# Patient Record
Sex: FEMALE | Race: WHITE | NOT HISPANIC OR LATINO | Employment: FULL TIME
[De-identification: names, ages, dates, MRNs, and addresses within clinical notes are randomized per-mention and may not be internally consistent; named-entity substitution may affect disease eponyms.]

---

## 2017-02-21 ENCOUNTER — RECORDS - HEALTHEAST (OUTPATIENT)
Dept: ADMINISTRATIVE | Facility: OTHER | Age: 57
End: 2017-02-21

## 2017-07-11 ENCOUNTER — RECORDS - HEALTHEAST (OUTPATIENT)
Dept: ADMINISTRATIVE | Facility: OTHER | Age: 57
End: 2017-07-11

## 2019-01-23 ENCOUNTER — RECORDS - HEALTHEAST (OUTPATIENT)
Dept: ADMINISTRATIVE | Facility: OTHER | Age: 59
End: 2019-01-23

## 2019-02-04 ENCOUNTER — RECORDS - HEALTHEAST (OUTPATIENT)
Dept: ADMINISTRATIVE | Facility: OTHER | Age: 59
End: 2019-02-04

## 2019-11-06 ENCOUNTER — RECORDS - HEALTHEAST (OUTPATIENT)
Dept: ADMINISTRATIVE | Facility: OTHER | Age: 59
End: 2019-11-06

## 2020-01-15 ENCOUNTER — RECORDS - HEALTHEAST (OUTPATIENT)
Dept: ADMINISTRATIVE | Facility: OTHER | Age: 60
End: 2020-01-15

## 2020-01-17 ENCOUNTER — OFFICE VISIT - HEALTHEAST (OUTPATIENT)
Dept: FAMILY MEDICINE | Facility: CLINIC | Age: 60
End: 2020-01-17

## 2020-01-17 ENCOUNTER — COMMUNICATION - HEALTHEAST (OUTPATIENT)
Dept: FAMILY MEDICINE | Facility: CLINIC | Age: 60
End: 2020-01-17

## 2020-01-17 DIAGNOSIS — Z12.31 VISIT FOR SCREENING MAMMOGRAM: ICD-10-CM

## 2020-01-17 DIAGNOSIS — Z00.00 ROUTINE GENERAL MEDICAL EXAMINATION AT A HEALTH CARE FACILITY: ICD-10-CM

## 2020-01-17 DIAGNOSIS — Z12.11 SCREEN FOR COLON CANCER: ICD-10-CM

## 2020-01-17 DIAGNOSIS — E78.2 MIXED HYPERLIPIDEMIA: ICD-10-CM

## 2020-01-17 DIAGNOSIS — Z13.1 DIABETES MELLITUS SCREENING: ICD-10-CM

## 2020-01-17 DIAGNOSIS — Z76.89 ENCOUNTER TO ESTABLISH CARE WITH NEW DOCTOR: ICD-10-CM

## 2020-01-17 DIAGNOSIS — Z30.09 GENERAL COUNSELING FOR PRESCRIPTION OF ORAL CONTRACEPTIVES: ICD-10-CM

## 2020-01-17 DIAGNOSIS — H02.729 HYPOTRICHOSIS OF EYELID, UNSPECIFIED LATERALITY: ICD-10-CM

## 2020-01-17 LAB
ANION GAP SERPL CALCULATED.3IONS-SCNC: 14 MMOL/L (ref 5–18)
BUN SERPL-MCNC: 16 MG/DL (ref 8–22)
CALCIUM SERPL-MCNC: 9.1 MG/DL (ref 8.5–10.5)
CHLORIDE BLD-SCNC: 107 MMOL/L (ref 98–107)
CHOLEST SERPL-MCNC: 216 MG/DL
CO2 SERPL-SCNC: 18 MMOL/L (ref 22–31)
CREAT SERPL-MCNC: 0.74 MG/DL (ref 0.6–1.1)
FASTING STATUS PATIENT QL REPORTED: YES
GFR SERPL CREATININE-BSD FRML MDRD: >60 ML/MIN/1.73M2
GLUCOSE BLD-MCNC: 79 MG/DL (ref 70–125)
HDLC SERPL-MCNC: 96 MG/DL
LDLC SERPL CALC-MCNC: 95 MG/DL
POTASSIUM BLD-SCNC: 4.3 MMOL/L (ref 3.5–5)
SODIUM SERPL-SCNC: 139 MMOL/L (ref 136–145)
TRIGL SERPL-MCNC: 123 MG/DL

## 2020-01-17 RX ORDER — CHLORAL HYDRATE 500 MG
1 CAPSULE ORAL DAILY
Status: SHIPPED | COMMUNITY
Start: 2020-01-17

## 2020-01-17 RX ORDER — SODIUM PHOSPHATE,MONO-DIBASIC 19G-7G/118
1 ENEMA (ML) RECTAL 3 TIMES DAILY
Status: SHIPPED | COMMUNITY
Start: 2020-01-17

## 2020-01-17 RX ORDER — CETIRIZINE HYDROCHLORIDE 10 MG/1
10 TABLET ORAL DAILY
Status: SHIPPED | COMMUNITY
Start: 2020-01-17

## 2020-01-17 ASSESSMENT — ANXIETY QUESTIONNAIRES
2. NOT BEING ABLE TO STOP OR CONTROL WORRYING: NOT AT ALL
7. FEELING AFRAID AS IF SOMETHING AWFUL MIGHT HAPPEN: NOT AT ALL
IF YOU CHECKED OFF ANY PROBLEMS ON THIS QUESTIONNAIRE, HOW DIFFICULT HAVE THESE PROBLEMS MADE IT FOR YOU TO DO YOUR WORK, TAKE CARE OF THINGS AT HOME, OR GET ALONG WITH OTHER PEOPLE: SOMEWHAT DIFFICULT
1. FEELING NERVOUS, ANXIOUS, OR ON EDGE: NOT AT ALL
3. WORRYING TOO MUCH ABOUT DIFFERENT THINGS: SEVERAL DAYS
5. BEING SO RESTLESS THAT IT IS HARD TO SIT STILL: NOT AT ALL
GAD7 TOTAL SCORE: 3
4. TROUBLE RELAXING: SEVERAL DAYS
6. BECOMING EASILY ANNOYED OR IRRITABLE: SEVERAL DAYS

## 2020-01-17 ASSESSMENT — PATIENT HEALTH QUESTIONNAIRE - PHQ9: SUM OF ALL RESPONSES TO PHQ QUESTIONS 1-9: 5

## 2020-02-11 ENCOUNTER — COMMUNICATION - HEALTHEAST (OUTPATIENT)
Dept: FAMILY MEDICINE | Facility: CLINIC | Age: 60
End: 2020-02-11

## 2020-02-12 ENCOUNTER — COMMUNICATION - HEALTHEAST (OUTPATIENT)
Dept: FAMILY MEDICINE | Facility: CLINIC | Age: 60
End: 2020-02-12

## 2020-05-02 ENCOUNTER — COMMUNICATION - HEALTHEAST (OUTPATIENT)
Dept: FAMILY MEDICINE | Facility: CLINIC | Age: 60
End: 2020-05-02

## 2020-05-02 DIAGNOSIS — H02.729 HYPOTRICHOSIS OF EYELID, UNSPECIFIED LATERALITY: ICD-10-CM

## 2021-01-08 ENCOUNTER — COMMUNICATION - HEALTHEAST (OUTPATIENT)
Dept: FAMILY MEDICINE | Facility: CLINIC | Age: 61
End: 2021-01-08

## 2021-01-28 ENCOUNTER — OFFICE VISIT - HEALTHEAST (OUTPATIENT)
Dept: FAMILY MEDICINE | Facility: CLINIC | Age: 61
End: 2021-01-28

## 2021-01-28 DIAGNOSIS — Z30.09 GENERAL COUNSELING FOR PRESCRIPTION OF ORAL CONTRACEPTIVES: ICD-10-CM

## 2021-01-28 DIAGNOSIS — Z12.31 ENCOUNTER FOR SCREENING MAMMOGRAM FOR BREAST CANCER: ICD-10-CM

## 2021-01-28 DIAGNOSIS — Z12.11 SCREEN FOR COLON CANCER: ICD-10-CM

## 2021-01-28 DIAGNOSIS — Z12.4 CERVICAL CANCER SCREENING: ICD-10-CM

## 2021-01-28 DIAGNOSIS — Z13.1 DIABETES MELLITUS SCREENING: ICD-10-CM

## 2021-01-28 DIAGNOSIS — E78.2 MIXED HYPERLIPIDEMIA: ICD-10-CM

## 2021-01-28 DIAGNOSIS — H02.729 HYPOTRICHOSIS OF EYELID, UNSPECIFIED LATERALITY: ICD-10-CM

## 2021-01-28 DIAGNOSIS — Z00.00 ROUTINE GENERAL MEDICAL EXAMINATION AT A HEALTH CARE FACILITY: ICD-10-CM

## 2021-01-28 LAB
ANION GAP SERPL CALCULATED.3IONS-SCNC: 10 MMOL/L (ref 5–18)
BUN SERPL-MCNC: 18 MG/DL (ref 8–22)
CALCIUM SERPL-MCNC: 8.6 MG/DL (ref 8.5–10.5)
CHLORIDE BLD-SCNC: 108 MMOL/L (ref 98–107)
CHOLEST SERPL-MCNC: 213 MG/DL
CO2 SERPL-SCNC: 22 MMOL/L (ref 22–31)
CREAT SERPL-MCNC: 0.79 MG/DL (ref 0.6–1.1)
FASTING STATUS PATIENT QL REPORTED: NO
GFR SERPL CREATININE-BSD FRML MDRD: >60 ML/MIN/1.73M2
GLUCOSE BLD-MCNC: 78 MG/DL (ref 70–125)
HBA1C MFR BLD: 5.1 %
HDLC SERPL-MCNC: 104 MG/DL
LDLC SERPL CALC-MCNC: 89 MG/DL
POTASSIUM BLD-SCNC: 4.4 MMOL/L (ref 3.5–5)
SODIUM SERPL-SCNC: 140 MMOL/L (ref 136–145)
TRIGL SERPL-MCNC: 100 MG/DL

## 2021-01-28 RX ORDER — BIMATOPROST 3 UG/ML
SOLUTION TOPICAL
Qty: 5 ML | Refills: 1 | Status: SHIPPED | OUTPATIENT
Start: 2021-01-28 | End: 2024-06-07

## 2021-01-28 ASSESSMENT — MIFFLIN-ST. JEOR: SCORE: 1222.37

## 2021-01-29 LAB
HPV SOURCE: ABNORMAL
HUMAN PAPILLOMA VIRUS 16 DNA: NEGATIVE
HUMAN PAPILLOMA VIRUS 18 DNA: NEGATIVE
HUMAN PAPILLOMA VIRUS FINAL DIAGNOSIS: ABNORMAL
HUMAN PAPILLOMA VIRUS OTHER HR: POSITIVE
SPECIMEN DESCRIPTION: ABNORMAL

## 2021-02-04 ENCOUNTER — RECORDS - HEALTHEAST (OUTPATIENT)
Dept: ADMINISTRATIVE | Facility: OTHER | Age: 61
End: 2021-02-04

## 2021-02-06 ENCOUNTER — COMMUNICATION - HEALTHEAST (OUTPATIENT)
Dept: FAMILY MEDICINE | Facility: CLINIC | Age: 61
End: 2021-02-06

## 2021-02-10 ENCOUNTER — COMMUNICATION - HEALTHEAST (OUTPATIENT)
Dept: OBGYN | Facility: CLINIC | Age: 61
End: 2021-02-10

## 2021-02-10 LAB
BKR LAB AP ABNORMAL BLEEDING: NO
BKR LAB AP BIRTH CONTROL/HORMONES: ABNORMAL
BKR LAB AP CERVICAL APPEARANCE: NORMAL
BKR LAB AP GYN ADEQUACY: ABNORMAL
BKR LAB AP GYN INTERPRETATION: ABNORMAL
BKR LAB AP GYN OTHER FINDINGS: ABNORMAL
BKR LAB AP HPV REFLEX: ABNORMAL
BKR LAB AP LMP: ABNORMAL
BKR LAB AP PATIENT STATUS: ABNORMAL
BKR LAB AP PREVIOUS ABNORMAL: 2011
BKR LAB AP PREVIOUS NORMAL: 2015
HIGH RISK?: NO
PATH REPORT.COMMENTS IMP SPEC: ABNORMAL
RESULT FLAG (HE HISTORICAL CONVERSION): ABNORMAL

## 2021-02-11 ENCOUNTER — COMMUNICATION - HEALTHEAST (OUTPATIENT)
Dept: FAMILY MEDICINE | Facility: CLINIC | Age: 61
End: 2021-02-11

## 2021-02-25 ENCOUNTER — AMBULATORY - HEALTHEAST (OUTPATIENT)
Dept: FAMILY MEDICINE | Facility: CLINIC | Age: 61
End: 2021-02-25

## 2021-02-25 DIAGNOSIS — R87.619 ABNORMAL PAP SMEAR OF CERVIX: ICD-10-CM

## 2021-02-25 LAB — HCG UR QL: NEGATIVE

## 2021-03-02 LAB
LAB AP CHARGES (HE HISTORICAL CONVERSION): NORMAL
PATH REPORT.COMMENTS IMP SPEC: NORMAL
PATH REPORT.COMMENTS IMP SPEC: NORMAL
PATH REPORT.FINAL DX SPEC: NORMAL
PATH REPORT.GROSS SPEC: NORMAL
PATH REPORT.MICROSCOPIC SPEC OTHER STN: NORMAL
PATH REPORT.RELEVANT HX SPEC: NORMAL
RESULT FLAG (HE HISTORICAL CONVERSION): NORMAL

## 2021-03-29 ENCOUNTER — COMMUNICATION - HEALTHEAST (OUTPATIENT)
Dept: OBGYN | Facility: CLINIC | Age: 61
End: 2021-03-29

## 2021-03-29 ENCOUNTER — AMBULATORY - HEALTHEAST (OUTPATIENT)
Dept: OBGYN | Facility: CLINIC | Age: 61
End: 2021-03-29

## 2021-05-27 ASSESSMENT — PATIENT HEALTH QUESTIONNAIRE - PHQ9: SUM OF ALL RESPONSES TO PHQ QUESTIONS 1-9: 5

## 2021-05-28 ASSESSMENT — ANXIETY QUESTIONNAIRES: GAD7 TOTAL SCORE: 3

## 2021-06-01 ENCOUNTER — RECORDS - HEALTHEAST (OUTPATIENT)
Dept: ADMINISTRATIVE | Facility: CLINIC | Age: 61
End: 2021-06-01

## 2021-06-04 VITALS — HEART RATE: 68 BPM | HEIGHT: 65 IN | SYSTOLIC BLOOD PRESSURE: 100 MMHG | DIASTOLIC BLOOD PRESSURE: 70 MMHG

## 2021-06-05 VITALS
HEIGHT: 64 IN | HEART RATE: 69 BPM | WEIGHT: 149 LBS | SYSTOLIC BLOOD PRESSURE: 110 MMHG | BODY MASS INDEX: 25.44 KG/M2 | DIASTOLIC BLOOD PRESSURE: 70 MMHG

## 2021-06-05 VITALS
BODY MASS INDEX: 26.06 KG/M2 | SYSTOLIC BLOOD PRESSURE: 121 MMHG | HEART RATE: 67 BPM | WEIGHT: 150.8 LBS | DIASTOLIC BLOOD PRESSURE: 66 MMHG

## 2021-06-05 NOTE — TELEPHONE ENCOUNTER
Called [facility] and requested 2012 colonoscopy report.   Notified ok for screening colonoscopy again in 2022

## 2021-06-05 NOTE — PROGRESS NOTES
Assessment/Plan:   Elidia is a 59-year-old female here for physical.    1. Encounter to establish care with new doctor  Establishing care today, all past medical history reviewed and updated in EMR.    2. Routine general medical examination at a health care facility  Physical completed today.  Labs as below.  Due for colonoscopy and mammogram, ordered today.  Appears that last Pap smear was 2015 which would mean she is due  of this year; patient thinks she may have had a Pap smear more recently, SHANKAR signed to obtain records.  - Lipid [place] FASTING  - Basic Metabolic Panel    3. Visit for screening mammogram  Patient believes last mammogram was 2 to 3 years ago, unable to see records at this time but given reported duration discussed that she is due for mammogram, ordered today.  - Mammo Screening Bilateral; Future    4. Screen for colon cancer  Patient reports last colonoscopy was at age 49, due for repeat colon cancer screening, colonoscopy ordered today.  - Ambulatory referral for Colonoscopy    5. Mixed hyperlipidemia  History hyperlipidemia, not currently on medications, will recheck levels today.  - Lipid [place] FASTING    6. Diabetes mellitus screening  Given age, will screen for diabetes today.  - Basic Metabolic Panel    7. Hypotrichosis of eyelid, unspecified laterality  Patient with loss of upper eyelashes bilaterally, wishes to try Latisse for treatment, prescription sent to pharmacy today.  - bimatoprost (LATISSE) 0.03 % ophthalmic solution; Administer 1 application to both eyes daily. Place one drop on applicator and apply evenly along the skin of the upper eyelid at base of eyelashes once daily at bedtime; repeat procedure for second eye (use a clean applicator).  Dispense: 5 mL; Refill: 1    8. General counseling for prescription of oral contraceptives  Pt reports continued menses - she is taking OCPs for regulation. She states a cousin is 62 and still menstruating; pt's mother   before menopause and her sister had a hysterectomy so not much data to assist with this. Pt wishes to continue OCPs at this time - discussed potential clot risk and pt voices understanding but wishes to continue therapy at this time. Discussed that if still menstruating in 1 year would recommend stopping OCPs to see if periods stop - would consider checking LH/FSH once off OCPs.  - desogestrel-ethinyl estradiol (ISIBLOOM) 0.15-0.03 mg per tablet; Take 1 tablet by mouth daily.  Dispense: 84 tablet; Refill: 3      I have had an Advance Directives discussion with the patient.    Follow up: June for pap smear, otherwise 1 year for physical    Kelsey Serna MD  [facility]    Subjective:     Elidia Monterroso is a 59 y.o. female who presents for an annual exam.     RLS - better with OTC iron and magnesium    Healthy Habits:   Healthy Diet: Yes  Regular Exercise: Yes  Sunscreen Use: Yes  Dental Visits Regularly: yes, due for visit  Seat Belt: Yes  Domestic abuse:   No    Health Maintenance reviewed:  Lipid Profile: yes  Glucose Screen: yes  Colonoscopy: due  Mammogram: due    Gynecologic History  Still having periods - LMP 10 days ago, taking OCPs -   Cousin 62 and still menstruating  Contraception: OCP (estrogen/progesterone)  Last Pap: 2015. Results were: Normal with negative HPV - due 5 yrs  History abnormal: 2009, cone biopsy 1985 for abnl pap smear    Immunization History   Administered Date(s) Administered     Hep B, Adult 06/01/2000, 08/02/2000, 01/15/2001     Influenza, Seasonal, Inj PF IIV3 04/03/1998     Influenza,seasonal,quad inj =/> 6months 01/23/2019, 11/06/2019     Td, adult adsorbed, PF 04/03/1998, 01/23/2019     Tdap 04/14/2008     Immunization status: up to date and documented.    Current Outpatient Medications   Medication Sig Dispense Refill     b complex vitamins tablet Take 1 tablet by mouth once.       cetirizine (ZYRTEC) 10 MG tablet Take 10 mg by mouth daily.       cholecalciferol, vitamin  D3, 25 mcg (1,000 unit) capsule Take 1,000 Units by mouth once.       fish oil-omega-3 fatty acids 300-1,000 mg capsule Take 1 g by mouth daily.       glucosamine-chondroitin 500-400 mg cap Take 1 capsule by mouth 3 (three) times a day.       bimatoprost (LATISSE) 0.03 % ophthalmic solution Administer 1 application to both eyes daily. Place one drop on applicator and apply evenly along the skin of the upper eyelid at base of eyelashes once daily at bedtime; repeat procedure for second eye (use a clean applicator). 5 mL 1     desogestrel-ethinyl estradiol (ISIBLOOM) 0.15-0.03 mg per tablet Take 1 tablet by mouth daily. 84 tablet 3     No current facility-administered medications for this visit.      Past Surgical History:   Procedure Laterality Date     CERVICAL CONE BIOPSY  1985     DILATION AND CURETTAGE OF UTERUS  1985     Patient has no known allergies.  Family History   Problem Relation Age of Onset     Diabetes Mother      Parkinsonism Mother      Other Mother         shingles     Valvular heart disease Mother         s/p surg     Arrhythmia Mother      Bone cancer Father 53     Lymphoma Maternal Grandmother 70     Other Maternal Grandfather 98        shingles     Stomach cancer Paternal Grandmother 77     Cancer Paternal Grandfather 65     Stroke Paternal Uncle 80     Heart attack Paternal Aunt 85     No Medical Problems Daughter      Social History     Socioeconomic History     Marital status: Single     Spouse name: Not on file     Number of children: Not on file     Years of education: Not on file     Highest education level: Not on file   Occupational History     Not on file   Social Needs     Financial resource strain: Not on file     Food insecurity:     Worry: Not on file     Inability: Not on file     Transportation needs:     Medical: Not on file     Non-medical: Not on file   Tobacco Use     Smoking status: Former Smoker     Packs/day: 1.00     Years: 30.00     Pack years: 30.00     Types: Cigarettes  "    Last attempt to quit:      Years since quittin.0     Smokeless tobacco: Never Used   Substance and Sexual Activity     Alcohol use: Yes     Frequency: 4 or more times a week     Drinks per session: 1 or 2     Binge frequency: Never     Drug use: Not Currently     Sexual activity: Yes     Partners: Male     Birth control/protection: Pill   Lifestyle     Physical activity:     Days per week: Not on file     Minutes per session: Not on file     Stress: Not on file   Relationships     Social connections:     Talks on phone: Not on file     Gets together: Not on file     Attends Uats[employer] service: Not on file     Active member of club or organization: Not on file     Attends meetings of clubs or organizations: Not on file     Relationship status: Not on file     Intimate partner violence:     Fear of current or ex partner: Not on file     Emotionally abused: Not on file     Physically abused: Not on file     Forced sexual activity: Not on file   Other Topics Concern     Not on file   Social History Narrative     Not on file       Review of Systems  General:  Denies problem  Eyes: Denies problem except loss of eyelashes  Ears/Nose/Throat: Denies problem  Cardiovascular: Denies problem  Respiratory:  Denies problem  Gastrointestinal:  Denies problem   Genitourinary: Denies problem  Musculoskeletal:  Denies problem  Skin: Denies problem except history of dermatitis  Neurologic: Denies problem  Psychiatric: Denies problem  Endocrine: Denies problem  Heme/Lymphatic: Denies problem   Allergic/Immunologic: Denies problem    Objective:        Vitals:    20 1031   BP: 100/70   Pulse: 68   Height: 5' 5\" (1.651 m)       Physical Exam:  General Appearance: Alert, pleasant, appears stated age  Head: Normocephalic, without obvious abnormality  Eyes: PERRL, conjunctiva/corneas clear, EOM's intact  Ears: Normal TM's and external ear canals, both ears  Nose: Nares normal, septum midline,mucosa normal, no " drainage  Throat: Lips, mucosa, and tongue normal; teeth and gums normal; oropharynx is clear  Neck: Supple,without lymphadenopathy, no thyromegaly or nodules noted  Lungs: Clear to auscultation bilaterally, respirations unlabored, no wheezing or crackles  Heart: Regular rate and rhythm, no murmur   Abdomen: Soft, non-tender, no masses, no organomegaly  Extremities: Extremities with strong and symmetric pulses, no cyanosis or edema  Skin: Skin color, texture normal, no rashes or lesions  Neurologic: Grossly normal, no focal deficits

## 2021-06-05 NOTE — TELEPHONE ENCOUNTER
Who is calling:  SHEEBA Almonte  Reason for Call:  States patient has had a colonoscopy on 2012 and is not due until 2022.  Please advise why patient needs one now.  Date of last appointment with primary care: 1/17/2020  Okay to luis angel a detailed message: Yes

## 2021-06-05 NOTE — PATIENT INSTRUCTIONS - HE
Labs today - letter if normal otherwise will call    Mammogram and colonoscopy ordered today - you will get a phone call to schedule these    Looks like pap smear due June - will let you know if this is due later based on records that we will get

## 2021-06-06 NOTE — TELEPHONE ENCOUNTER
Please call pt to let her know that per review of records:    She is up to date on mammogram - was done 2/4/19 - plan repeat in 2 years    She is due for pap smear now. Records state pt had abnormal pap smears in 2013 and 2014 but normal in 2015 therefore needs repeat in 3 years - so will do now. Help schedule appointment for this.    Dr Serna

## 2021-06-06 NOTE — TELEPHONE ENCOUNTER
Reason contacted:  Status update  Information relayed:  Below message   Additional questions:  No  Further follow-up needed:  No  Okay to leave a detailed message:  No

## 2021-06-07 NOTE — TELEPHONE ENCOUNTER
RN cannot approve Refill Request    RN can NOT refill this medication Protocol failed and NO refill given. Last office visit: Visit date not found Last Physical: 1/17/2020 Last MTM visit: Visit date not found Last visit same specialty: Visit date not found.  Next visit within 3 mo: Visit date not found  Next physical within 3 mo: Visit date not found      Cris Mccord, [facility] Triage/Med Refill 5/2/2020    Requested Prescriptions   Pending Prescriptions Disp Refills     bimatoprost (LATISSE) 0.03 % ophthalmic solution [Pharmacy Med Name: BIMATOPROST 0.03% OPH SOLW/BRUSHES] 5 mL 1     Sig: APPLY 1 DROP EVENLY ALONG THE SKIN OF THE UPPER EYELID AT BASE OF EYELASHES. USE NIGHTLY       There is no refill protocol information for this order           
no

## 2021-06-14 NOTE — TELEPHONE ENCOUNTER
Left message to call back for: pt  Information to relay to patient:  Left message for patient to call us back to scheduled with dr rivera that we do accept [employer]  Ins. Call back number  [phone]

## 2021-06-14 NOTE — PROGRESS NOTES
Assessment/Plan:   Elidia is a 60 year old female here for physical without additional concerns.    1. Routine general medical examination at a health care facility  Physical completed today. Labs as below. Vaccines up to date. Pap smear done today; mammogram and colonoscopy ordered.   - Basic Metabolic Panel    2. Mixed hyperlipidemia  Hx HLD not requiring medication, will recheck levels today.  - Lipid [place] RANDOM    3. Diabetes mellitus screening  Given age, will screen for diabetes today.  - Glycosylated Hemoglobin A1c    4. Cervical cancer screening  Due for pap smear cotesting, completed today.  - Gynecologic Cytology (PAP Smear)    5. Hypotrichosis of eyelid, unspecified laterality  Pt requests refill of latisse for eyelashes.  - bimatoprost (LATISSE) 0.03 % ophthalmic solution; APPLY 1 DROP EVENLY ALONG THE SKIN OF THE UPPER EYELID AT BASE OF EYELASHES. USE NIGHTLY  Dispense: 5 mL; Refill: 1    6. General counseling for prescription of oral contraceptives  Pt using OCPs for hormone replacement, aware of potential risks, requests refill which was provided.  - desogestreL-ethinyl estradioL (ISIBLOOM) 0.15-0.03 mg per tablet; Take 1 tablet by mouth daily.  Dispense: 84 tablet; Refill: 3    7. Encounter for screening mammogram for breast cancer  Due for mammogram, ordered today.  - Mammo Screening Bilateral; Future    8. Screen for colon cancer  Due for colon cancer screening, colonoscopy ordered today.  - Ambulatory referral for Colonoscopy - [facility]      I have had an Advance Directives discussion with the patient.    Follow up: 1 year for physical, sooner as needed    Kelsey Serna MD  [facility]    Subjective:     Elidia Monterroso is a 60 y.o. female who presents for an annual exam.     Healthy Habits:   Healthy Diet: Yes  Regular Exercise: Yes  Sunscreen Use: Yes  Dental Visits Regularly: Yes  Seat Belt: Yes  Domestic abuse:   No    Health Maintenance reviewed:  Lipid Profile:  due  Glucose Screen: due  Colonoscopy: due  Mammogram: due    Gynecologic History  No LMP recorded.  Contraception: post menopausal status  pap hx: 2015 nml with neg HPV  abnl pap 4/25/11 ASCUS, 2013 and 2014 but nml HPV  hx colp 2010    Immunization History   Administered Date(s) Administered     Hep B, Adult 06/01/2000, 08/02/2000, 01/15/2001     Influenza, Seasonal, Inj PF IIV3 04/03/1998     Influenza, inj, historic,unspecified 11/01/2020     Influenza,seasonal,quad inj =/> 6months 01/23/2019, 11/06/2019     Td, adult adsorbed, PF 04/03/1998, 01/23/2019     Tdap 04/14/2008     Immunization status: up to date and documented.    Current Outpatient Medications   Medication Sig Dispense Refill     b complex vitamins tablet Take 1 tablet by mouth once.       bimatoprost (LATISSE) 0.03 % ophthalmic solution APPLY 1 DROP EVENLY ALONG THE SKIN OF THE UPPER EYELID AT BASE OF EYELASHES. USE NIGHTLY 5 mL 1     cetirizine (ZYRTEC) 10 MG tablet Take 10 mg by mouth daily.       cholecalciferol, vitamin D3, 25 mcg (1,000 unit) capsule Take 1,000 Units by mouth once.       desogestreL-ethinyl estradioL (ISIBLOOM) 0.15-0.03 mg per tablet Take 1 tablet by mouth daily. 84 tablet 3     fish oil-omega-3 fatty acids 300-1,000 mg capsule Take 1 g by mouth daily.       glucosamine-chondroitin 500-400 mg cap Take 1 capsule by mouth 3 (three) times a day.       No current facility-administered medications for this visit.      History reviewed. No pertinent past medical history.  Past Surgical History:   Procedure Laterality Date     CERVICAL CONE BIOPSY  1985     DILATION AND CURETTAGE OF UTERUS  1985     Patient has no known allergies.  Family History   Problem Relation Age of Onset     Diabetes Mother      Parkinsonism Mother      Other Mother         shingles     Valvular heart disease Mother         s/p surg     Arrhythmia Mother      Bone cancer Father 53     Lymphoma Maternal Grandmother 70     Other Maternal Grandfather 98         shingles     Stomach cancer Paternal Grandmother 77     Cancer Paternal Grandfather 65     Stroke Paternal Uncle 80     Heart attack Paternal Aunt 85     No Medical Problems Daughter      Social History     Socioeconomic History     Marital status: Single     Spouse name: Not on file     Number of children: Not on file     Years of education: Not on file     Highest education level: Not on file   Occupational History     Not on file   Social Needs     Financial resource strain: Not on file     Food insecurity     Worry: Not on file     Inability: Not on file     Transportation needs     Medical: Not on file     Non-medical: Not on file   Tobacco Use     Smoking status: Former Smoker     Packs/day: 1.00     Years: 30.00     Pack years: 30.00     Types: Cigarettes     Quit date:      Years since quittin.0     Smokeless tobacco: Never Used   Substance and Sexual Activity     Alcohol use: Yes     Frequency: 4 or more times a week     Drinks per session: 1 or 2     Binge frequency: Never     Drug use: Not Currently     Sexual activity: Yes     Partners: Male     Birth control/protection: Pill   Lifestyle     Physical activity     Days per week: Not on file     Minutes per session: Not on file     Stress: Not on file   Relationships     Social connections     Talks on phone: Not on file     Gets together: Not on file     Attends Gnosticism service: Not on file     Active member of club or organization: Not on file     Attends meetings of clubs or organizations: Not on file     Relationship status: Not on file     Intimate partner violence     Fear of current or ex partner: Not on file     Emotionally abused: Not on file     Physically abused: Not on file     Forced sexual activity: Not on file   Other Topics Concern     Not on file   Social History Narrative     Not on file       Review of Systems  General:  Denies problem  Eyes: Denies problem  Ears/Nose/Throat: Denies problem  Cardiovascular: Denies  "problem  Respiratory:  Denies problem  Gastrointestinal:  Denies problem   Genitourinary: Denies problem  Musculoskeletal:  Denies problem  Skin: Denies problem  Neurologic: Denies problem  Psychiatric: Denies problem  Endocrine: Denies problem  Heme/Lymphatic: Denies problem   Allergic/Immunologic: Denies problem    Objective:        Vitals:    01/28/21 1353   BP: 110/70   Pulse: 69   Weight: 149 lb (67.6 kg)   Height: 5' 3.78\" (1.62 m)     Body mass index is 25.75 kg/m .    Physical Exam:  General Appearance: Alert, pleasant, appears stated age  Head: Normocephalic, without obvious abnormality  Eyes: PERRL, conjunctiva/corneas clear, EOM's intact  Ears: Normal TM's and external ear canals, both ears  Nose: Nares normal, septum midline,mucosa normal, no drainage  Throat: Lips, mucosa, and tongue normal; teeth and gums normal; oropharynx is clear  Neck: Supple,without lymphadenopathy, no thyromegaly or nodules noted  Lungs: Clear to auscultation bilaterally, respirations unlabored, no wheezing or crackles  Heart: Regular rate and rhythm, no murmur   Abdomen: Soft, non-tender, no masses, no organomegaly  Extremities: Extremities with strong and symmetric pulses, no cyanosis or edema  Skin: Skin color, texture normal, no rashes or lesions  Neurologic: Grossly normal, no focal deficits  Pelvic exam: normal female genitalia, normal appearing cervix, no significant discharge    "

## 2021-06-15 NOTE — PROGRESS NOTES
1/28/21 ASCUS and AGC of endocervical origin, +HR HPV (not 16/18). Plan: colposcopy with ECC and EMB due before 4/28/21

## 2021-06-15 NOTE — TELEPHONE ENCOUNTER
----- Message from Kelsey Serna MD sent at 2/10/2021 11:24 AM CST -----  Can someone call pt to see if she is having any vaginal discharge, itching or burning/pain? Her pap smear showed incidental bacterial vaginosis that only needs treatment if pt is having symptoms. If needed can treat with oral medication or vaginal cream.    Thanks  Dr Serna  ----- Message -----  From: Carmen Dunlap RN  Sent: 2/10/2021  10:27 AM CST  To: Kelsey Serna MD    Hi Dr Serna,    61 yo pt with ASCUS and AGC of endocervical origin, +HR HPV (not 16/18). There was an incidental finding on her pap smear consistent with BV. If follow up from this incidental finding is needed, please forward to your nurse team as incidental findings are not handled by the pap team.    Colposcopy with ECC and EMB is recommended for this pap result. Please advise who you recommend the patient have these procedures completed with, and place referral order if needed. Pap RN will call patient with pap/HPV results and follow up information. Thanks!    Carmen Dunlap RN BSN, Pap Tracking

## 2021-06-15 NOTE — TELEPHONE ENCOUNTER
LMTCB- please see note below and ask if she is having any of the following symptoms, if she is what type of treatment

## 2021-06-15 NOTE — PROGRESS NOTES
Patient was seen for colposcopy today however we did have discussions regarding her ongoing use of the oral contraceptive pill.  We discussed that with her age and likely postmenopausal status, that we can work on transitioning her off the pill and use hormone replacement therapy if needed.  We discussed that we will not know her menopausal status completely until she is off the pill.  We discussed signs and symptoms of menopause.  We discussed that this may be more likely as were working her off the oral contraceptive pill.  We discussed the risks of being on continued oral contraceptive including increased risk of stroke, breast cancer and DVT.  She is in agreement with the plan that we will be taking her off the oral contraceptive pill, consider recheck of FSH in a month.  Monitoring over the next several months to see if we do need to institute hormone replacement therapy.  She will have a follow-up telephone visit in 3 weeks to discuss how she is doing off the pill.      Colposcopy Procedure Note    Elidia Monterroso is a 60 y.o. female is here today for a Colposcopy.    Indications: Pap smear 1 months ago showed: glandular cell abnormality (CHRIS) and high risk HPV. The prior pap showed no abnormalities.  Prior cervical/vaginal disease: HPV related changes. Prior cervical treatment: no treatment.    Procedure Details   The reason for procedure is explained and informed consent obtained.    Speculum placed in vagina and visualization of cervix achieved, cervix swabbed with 3% acetic acid solution. Cervix is also swabbed with Lugol's solution. Procedure detailsCervix swabbed with Lugol's solution, Endocervical curettage performed, Endometrial biopsy performed, Cervical biopsies taken at 5 o'clock, Specimen labelled and sent to pathology and Hemostasis achieved with Monsel's solution    Findings:  Cervix: SCJ visualized - lesion at 5 o'clock;       Specimens: See orders    Complications: none.    Plan:  Specimens  labelled and sent to Pathology. Role of HPV in causing cervical pap smear abnormalities, dysplasia, and cancer is reviewed with the patient. She will be contacted in a few days with biopsy results and recommendations.    Kya Tello

## 2021-06-16 PROBLEM — F17.211 CIGARETTE NICOTINE DEPENDENCE IN REMISSION: Status: ACTIVE | Noted: 2020-01-17

## 2021-06-16 PROBLEM — H91.93 BILATERAL HEARING LOSS: Status: ACTIVE | Noted: 2020-01-17

## 2021-06-16 PROBLEM — E78.2 MIXED HYPERLIPIDEMIA: Status: ACTIVE | Noted: 2020-01-17

## 2021-06-16 PROBLEM — G25.81 RESTLESS LEGS SYNDROME (RLS): Status: ACTIVE | Noted: 2020-01-17

## 2021-06-16 PROBLEM — L30.9 DERMATITIS: Status: ACTIVE | Noted: 2020-01-17

## 2021-06-16 NOTE — PROGRESS NOTES
2/25/21 Colpo ECC neg, EMB neg, bx epithelial atypia suggestive of HPV effect, cervicitis. Plan: cotest in 1 year

## 2021-08-22 ENCOUNTER — HEALTH MAINTENANCE LETTER (OUTPATIENT)
Age: 61
End: 2021-08-22

## 2021-10-17 ENCOUNTER — HEALTH MAINTENANCE LETTER (OUTPATIENT)
Age: 61
End: 2021-10-17

## 2021-11-01 ENCOUNTER — ANCILLARY PROCEDURE (OUTPATIENT)
Dept: MAMMOGRAPHY | Facility: CLINIC | Age: 61
End: 2021-11-01
Payer: COMMERCIAL

## 2021-11-01 DIAGNOSIS — Z12.31 ENCOUNTER FOR SCREENING MAMMOGRAM FOR BREAST CANCER: ICD-10-CM

## 2021-11-01 PROCEDURE — 77067 SCR MAMMO BI INCL CAD: CPT | Mod: TC | Performed by: RADIOLOGY

## 2021-12-15 ENCOUNTER — OFFICE VISIT (OUTPATIENT)
Dept: FAMILY MEDICINE | Facility: CLINIC | Age: 61
End: 2021-12-15
Payer: COMMERCIAL

## 2021-12-15 VITALS — SYSTOLIC BLOOD PRESSURE: 102 MMHG | WEIGHT: 148.3 LBS | BODY MASS INDEX: 25.63 KG/M2 | DIASTOLIC BLOOD PRESSURE: 70 MMHG

## 2021-12-15 DIAGNOSIS — R20.2 NUMBNESS AND TINGLING IN BOTH HANDS: Primary | ICD-10-CM

## 2021-12-15 DIAGNOSIS — R20.0 NUMBNESS AND TINGLING IN BOTH HANDS: Primary | ICD-10-CM

## 2021-12-15 PROCEDURE — 90682 RIV4 VACC RECOMBINANT DNA IM: CPT | Performed by: FAMILY MEDICINE

## 2021-12-15 PROCEDURE — 99214 OFFICE O/P EST MOD 30 MIN: CPT | Mod: 25 | Performed by: FAMILY MEDICINE

## 2021-12-15 PROCEDURE — 90471 IMMUNIZATION ADMIN: CPT | Performed by: FAMILY MEDICINE

## 2021-12-15 NOTE — PROGRESS NOTES
Assessment/Plan:    Numbness and tingling in both hands  Unclear etiology - possible carpal tunnel vs neuropathy. Recommend evaluation with EMG; will trial bilat wrist braces as well. If EMG normal and not improving with bracing then would consider XR and possibly labs for autoimmune arthritis; refer ortho if needed. We did discuss monitoring trigger finger and if needed could refer to ortho for steroid injection vs surgery.  - EMG  - Wrist/Arm/Hand Supplies Order for DME - ONLY FOR DME       Follow up: pending EMG result    Kelsey Serna MD  [facility]    Subjective:   Elidia Monterroso is a 61 year old female is here today for hand pain    -bilateral hands  -started 3 months ago - sx every day, not getting better  -has numbness/tingling in the mornings - just the fingers but not the thumb, can also happen throughout the day - not really with typing/driving  -L hand is worst, nondominant   -active with hands during the day  -will get trigger finger for a few fingers  -hands always feel sore/number  -bilat thumb OA known      Patient Active Problem List   Diagnosis     Restless legs syndrome (RLS)     Mixed hyperlipidemia     Cigarette nicotine dependence in remission     Dermatitis     Bilateral hearing loss     ASCUS with positive high risk HPV cervical     History reviewed. No pertinent past medical history.  Past Surgical History:   Procedure Laterality Date     CONIZATION  1985     DILATION AND CURETTAGE  1985     Current Outpatient Medications   Medication     b complex vitamins tablet     bimatoprost (LATISSE) 0.03 % ophthalmic solution     cetirizine (ZYRTEC) 10 MG tablet     cholecalciferol, vitamin D3, 25 mcg (1,000 unit) capsule     fish oil-omega-3 fatty acids 300-1,000 mg capsule     glucosamine-chondroitin 500-400 mg cap     No current facility-administered medications for this visit.     No Known Allergies  Social History     Socioeconomic History     Marital status: Single     Spouse  name: Not on file     Number of children: Not on file     Years of education: Not on file     Highest education level: Not on file   Occupational History     Not on file   Tobacco Use     Smoking status: Former Smoker     Packs/day: 1.00     Years: 30.00     Pack years: 30.00     Types: Cigarettes     Quit date: 2002     Years since quittin.9     Smokeless tobacco: Never Used   Substance and Sexual Activity     Alcohol use: Yes     Drug use: Not Currently     Sexual activity: Yes     Partners: Male     Birth control/protection: Pill   Other Topics Concern     Not on file   Social History Narrative     Not on file     Social Determinants of Health     Financial Resource Strain: Not on file   Food Insecurity: Not on file   Transportation Needs: Not on file   Physical Activity: Not on file   Stress: Not on file   Social Connections: Not on file   Intimate Partner Violence: Not on file   Housing Stability: Not on file     Family History   Problem Relation Age of Onset     Diabetes Mother      Parkinsonism Mother      Other - See Comments Mother         shingles     Valvular heart disease Mother         s/p surg     Arrhythmia Mother      Bone Cancer Father 53.00     Lymphoma Maternal Grandmother 70.00     Other - See Comments Maternal Grandfather 98.00        shingles     Stomach Cancer Paternal Grandmother 77.00     Cancer Paternal Grandfather 65.00     Cerebrovascular Disease Paternal Uncle 80.00     Coronary Artery Disease Paternal Aunt 85.00     No Known Problems Daughter      Review of systems is as stated in HPI, and the remainder of system review is otherwise negative.    Objective:     /70 (BP Location: Right arm, Patient Position: Sitting, Cuff Size: Adult Regular)   Wt 67.3 kg (148 lb 4.8 oz)   BMI 25.63 kg/m      General appearance: awake, NAD  HEENT: atraumatic, normocephalic, no scleral icterus or injection  Lungs: breathing comfortably on room air  Extremities: negative phalen and tinel  signs, no appreciable swelling/effusion of hands  Neuro: alert, oriented x3, CNs grossly intact, no focal deficits appreciated  Psych: normal mood/affect/behavior, answering questions appropriately, linear thought process

## 2021-12-15 NOTE — PATIENT INSTRUCTIONS
Trigger finger - monitor - if needed can refer to hand orthopedic for injection/management - can send [employer] message if need/want this    Tingling  -will do nerve test called EMG - they will call you to get it get up  -trial wrist bracing in the meantime

## 2022-01-18 ENCOUNTER — TRANSFERRED RECORDS (OUTPATIENT)
Dept: HEALTH INFORMATION MANAGEMENT | Facility: CLINIC | Age: 62
End: 2022-01-18

## 2022-01-18 ENCOUNTER — OFFICE VISIT (OUTPATIENT)
Dept: NEUROLOGY | Facility: CLINIC | Age: 62
End: 2022-01-18
Attending: FAMILY MEDICINE
Payer: COMMERCIAL

## 2022-01-18 DIAGNOSIS — R20.2 NUMBNESS AND TINGLING IN BOTH HANDS: ICD-10-CM

## 2022-01-18 DIAGNOSIS — R20.0 NUMBNESS AND TINGLING IN BOTH HANDS: ICD-10-CM

## 2022-01-18 DIAGNOSIS — G56.03 BILATERAL CARPAL TUNNEL SYNDROME: Primary | ICD-10-CM

## 2022-01-18 PROCEDURE — 95886 MUSC TEST DONE W/N TEST COMP: CPT | Mod: RT | Performed by: PSYCHIATRY & NEUROLOGY

## 2022-01-18 PROCEDURE — 95911 NRV CNDJ TEST 9-10 STUDIES: CPT | Performed by: PSYCHIATRY & NEUROLOGY

## 2022-01-18 NOTE — PROCEDURES
[facility]     Formerly [facility]  [address] [place] [address], [address]  [place] [address]  Tel: [phone]  Fax: [phone]          Full Name: Juan Monterroso Gender: Female  Patient ID: [id number] YOB: 1960      Visit Date: 1/18/2022 09:50  Age: 61 Years 1 Months Old  Interpreted By: Ronny Herron MD   Ref Dr.: Kelsey Serna MD  Tech: ST   Height: 5 feet 5 inch  Reason for referral: Evaluate bilateral uppers. c/o numbness, tingling in both hands for a year. Right > Left.      Motor NCS      Nerve / Sites Lat Amp Dist Maciel    ms mV cm m/s   R Median - APB      Wrist 6.82 2.8 7       Elbow 11.88 1.6 23 46   L Median - APB      Wrist 4.38 7.0 7       Elbow 8.54 6.0 23 55   R Ulnar - ADM      Wrist 2.81 9.9 7       B.Elbow 5.94 9.7 18 58      A.Elbow 7.92 8.2 10 51       F  Wave      Nerve Fmin    ms   R Ulnar - ADM 27.19       Sensory NCS      Nerve / Sites Onset Lat Pk Lat Amp.2-3 Dist Maciel Lat Diff    ms ms  V cm m/s ms   R Median - II (Antidr)      Wrist 3.85 5.36 22.3 13 34    L Median - II (Antidr)      Wrist 2.76 3.65 22.7 13 47    R Ulnar - V (Antidr)      Wrist 2.14 3.13 16.9 11 52    R Median, Ulnar - Transcarpal comparison      Median Palm 3.13 3.80 13.6 8 26       Ulnar Palm 1.51 2.03 26.9 8 53          1.77   L Median, Ulnar - Transcarpal comparison      Median Palm 1.98 2.55 33.7 8 40       Ulnar Palm 1.56 2.03 27.8 8 51          0.52       EMG Summary Table     Spontaneous MUAP Rcmt Note   Muscle Fib PSW Fasc IA # Amp Dur PPP Rate Type   R. Brachioradialis None None None N N N N N N N   R. Pronator teres None None None N N N N N N N   R. Biceps brachii None None None N N N N N N N   R. Deltoid None None None N N N N N N N   R. Triceps brachii None None None N N N N N N N   R. Flexor carpi ulnaris None None None N N N N N N N   R. First dorsal interosseous None None None N N N N N N N   R. Abductor pollicis brevis None None None  N Sl Red Incr Incr N N N   L. Brachioradialis None None None N N N N N N N   L. Pronator teres None None None N N N N N N N   L. Biceps brachii None None None N N N N N N N   L. Deltoid None None None N N N N N N N   L. Triceps brachii None None None N N N N N N N   L. First dorsal interosseous None None None N N N N N N N   L. Abductor pollicis brevis None None None N N N N N N N       SUMMARY  Nerve conduction and EMG study of bilateral upper extremities shows:    Prolonged right median nerve distal motor latency with decreased amplitude and decreased conduction velocity.  Normal left median nerve distal motor latency, amplitude and conduction velocity.  Normal right ulnar distal motor latency, amplitude and conduction velocity.  Normal bilateral median and right ulnar sensory SNAPs with increased right median peak latency.  Increased bilateral (R>L) median-ulnar transcarpal peak latency comparison.  Monopolar needle exam as above.      CLINICAL INTERPRETATION:  This is an abnormal nerve conduction and EMG study.  The study is suggestive of a mild left carpal tunnel syndrome and a moderate to severe right carpal tunnel syndrome.  Further clinical correlation is needed.     Ronny Herron MD  Neurologist  [facility]  Tel:- [phone]

## 2022-01-18 NOTE — LETTER
1/18/2022         RE: Elidia Monterroso  [address] [address]        Dear Colleague,    Thank you for referring your patient, Elidia Monterroso, to the [facility] [place]. Please see a copy of my visit note below.    See procedure note.       Again, thank you for allowing me to participate in the care of your patient.        Sincerely,        Ronny Herron MD

## 2022-01-26 ENCOUNTER — TRANSFERRED RECORDS (OUTPATIENT)
Dept: HEALTH INFORMATION MANAGEMENT | Facility: CLINIC | Age: 62
End: 2022-01-26
Payer: COMMERCIAL

## 2022-02-09 ENCOUNTER — PATIENT OUTREACH (OUTPATIENT)
Dept: FAMILY MEDICINE | Facility: CLINIC | Age: 62
End: 2022-02-09
Payer: COMMERCIAL

## 2022-02-09 PROBLEM — R87.610 ASCUS WITH POSITIVE HIGH RISK HPV CERVICAL: Status: ACTIVE | Noted: 2021-01-28

## 2022-02-09 PROBLEM — R87.810 ASCUS WITH POSITIVE HIGH RISK HPV CERVICAL: Status: ACTIVE | Noted: 2021-01-28

## 2022-02-09 NOTE — LETTER
February 9, 2022      Elidia Monterroso  [address] [address]        Dear ,    This letter is to remind you that you are due for your follow-up Pap smear and Human Papillomavirus (HPV) test.    Please call [phone] to schedule your appointment at your earliest convenience.    If you have completed the appointment outside of the [facility] system, please have the records forwarded to our office. We will update your chart for your provider to review before your next annual wellness visit.     Thank you for choosing [facility]!      Sincerely,    Your [facility] Care Team

## 2022-03-10 NOTE — TELEPHONE ENCOUNTER
Patient due for Pap and HPV.    Reminder done today via telephone call-spoke to the pt phone number given to make an appt.

## 2022-03-30 ENCOUNTER — TRANSFERRED RECORDS (OUTPATIENT)
Dept: HEALTH INFORMATION MANAGEMENT | Facility: CLINIC | Age: 62
End: 2022-03-30
Payer: COMMERCIAL

## 2022-04-02 ENCOUNTER — HEALTH MAINTENANCE LETTER (OUTPATIENT)
Age: 62
End: 2022-04-02

## 2022-04-06 ENCOUNTER — TRANSFERRED RECORDS (OUTPATIENT)
Dept: HEALTH INFORMATION MANAGEMENT | Facility: CLINIC | Age: 62
End: 2022-04-06
Payer: COMMERCIAL

## 2022-05-10 PROBLEM — K63.5 POLYP OF COLON: Status: ACTIVE | Noted: 2021-02-04

## 2022-05-11 ENCOUNTER — OFFICE VISIT (OUTPATIENT)
Dept: FAMILY MEDICINE | Facility: CLINIC | Age: 62
End: 2022-05-11
Payer: COMMERCIAL

## 2022-05-11 VITALS
HEIGHT: 64 IN | DIASTOLIC BLOOD PRESSURE: 84 MMHG | HEART RATE: 64 BPM | BODY MASS INDEX: 24.59 KG/M2 | OXYGEN SATURATION: 100 % | WEIGHT: 144 LBS | SYSTOLIC BLOOD PRESSURE: 114 MMHG

## 2022-05-11 DIAGNOSIS — Z12.4 CERVICAL CANCER SCREENING: ICD-10-CM

## 2022-05-11 DIAGNOSIS — Z13.1 DIABETES MELLITUS SCREENING: ICD-10-CM

## 2022-05-11 DIAGNOSIS — Z00.00 ROUTINE ADULT HEALTH MAINTENANCE: Primary | ICD-10-CM

## 2022-05-11 DIAGNOSIS — L70.0 ACNE VULGARIS: ICD-10-CM

## 2022-05-11 DIAGNOSIS — E78.2 MIXED HYPERLIPIDEMIA: ICD-10-CM

## 2022-05-11 DIAGNOSIS — Z11.59 NEED FOR HEPATITIS C SCREENING TEST: ICD-10-CM

## 2022-05-11 DIAGNOSIS — Z11.4 SCREENING FOR HIV (HUMAN IMMUNODEFICIENCY VIRUS): ICD-10-CM

## 2022-05-11 LAB
ANION GAP SERPL CALCULATED.3IONS-SCNC: 14 MMOL/L (ref 5–18)
BUN SERPL-MCNC: 19 MG/DL (ref 8–22)
CALCIUM SERPL-MCNC: 10.1 MG/DL (ref 8.5–10.5)
CHLORIDE BLD-SCNC: 104 MMOL/L (ref 98–107)
CHOLEST SERPL-MCNC: 201 MG/DL
CO2 SERPL-SCNC: 23 MMOL/L (ref 22–31)
CREAT SERPL-MCNC: 0.68 MG/DL (ref 0.6–1.1)
FASTING STATUS PATIENT QL REPORTED: NO
GFR SERPL CREATININE-BSD FRML MDRD: >90 ML/MIN/1.73M2
GLUCOSE BLD-MCNC: 87 MG/DL (ref 70–125)
HBA1C MFR BLD: 5.4 % (ref 0–5.6)
HDLC SERPL-MCNC: 95 MG/DL
HIV 1+2 AB+HIV1 P24 AG SERPL QL IA: NEGATIVE
LDLC SERPL CALC-MCNC: 93 MG/DL
POTASSIUM BLD-SCNC: 4.6 MMOL/L (ref 3.5–5)
SODIUM SERPL-SCNC: 141 MMOL/L (ref 136–145)
TRIGL SERPL-MCNC: 65 MG/DL

## 2022-05-11 PROCEDURE — 87389 HIV-1 AG W/HIV-1&-2 AB AG IA: CPT | Performed by: FAMILY MEDICINE

## 2022-05-11 PROCEDURE — 0054A COVID-19,PF,PFIZER (12+ YRS): CPT | Performed by: FAMILY MEDICINE

## 2022-05-11 PROCEDURE — 99396 PREV VISIT EST AGE 40-64: CPT | Mod: 25 | Performed by: FAMILY MEDICINE

## 2022-05-11 PROCEDURE — 91305 COVID-19,PF,PFIZER (12+ YRS): CPT | Performed by: FAMILY MEDICINE

## 2022-05-11 PROCEDURE — 83036 HEMOGLOBIN GLYCOSYLATED A1C: CPT | Performed by: FAMILY MEDICINE

## 2022-05-11 PROCEDURE — 80048 BASIC METABOLIC PNL TOTAL CA: CPT | Performed by: FAMILY MEDICINE

## 2022-05-11 PROCEDURE — 86803 HEPATITIS C AB TEST: CPT | Performed by: FAMILY MEDICINE

## 2022-05-11 PROCEDURE — 87624 HPV HI-RISK TYP POOLED RSLT: CPT | Performed by: FAMILY MEDICINE

## 2022-05-11 PROCEDURE — G0123 SCREEN CERV/VAG THIN LAYER: HCPCS | Performed by: FAMILY MEDICINE

## 2022-05-11 PROCEDURE — 80061 LIPID PANEL: CPT | Performed by: FAMILY MEDICINE

## 2022-05-11 PROCEDURE — 36415 COLL VENOUS BLD VENIPUNCTURE: CPT | Performed by: FAMILY MEDICINE

## 2022-05-11 RX ORDER — TRETINOIN 0.5 MG/G
CREAM TOPICAL
Qty: 45 G | Refills: 0 | Status: SHIPPED | OUTPATIENT
Start: 2022-05-11

## 2022-05-11 NOTE — PROGRESS NOTES
Assessment/Plan:   Elidia is a 61-year-old female here for physical without additional concerns.    Routine adult health maintenance  Physical completed today.  Labs as below.  Pap smear completed today.  Up-to-date with colon cancer screening and mammogram.  COVID booster administered today, patient will check with insurance regarding shingles coverage.  - Basic metabolic panel  (Ca, Cl, CO2, Creat, Gluc, K, Na, BUN)  - COVID-19,PF,PFIZER (12+ Yrs GRAY LABEL)    Cervical cancer screening  History ASCUS with positive other HPV, colposcopy showed HPV changes and chronic cervicitis, cotesting completed today.  - Pap Screen with HPV - recommended age 30 - 65 years    Mixed hyperlipidemia  History hyperlipidemia but not requiring statin therapy, will recheck levels today.  - Lipid panel reflex to direct LDL Fasting    Diabetes mellitus screening  We will screen for diabetes today with A1c.  - Hemoglobin A1c    Screening for HIV (human immunodeficiency virus)  Due for one-time HIV screen.  - HIV Antigen Antibody Combo    Need for hepatitis C screening test  Due for one-time hep C screen.  - Hepatitis C Screen Reflex to HCV RNA Quant and Genotype    Acne vulgaris  Patient reports previously following with dermatology and using a retinoid prescription for bumps on her forehead, suspect mild acne and will manage with Retin-A as below.  - tretinoin (RETIN-A) 0.05 % external cream  Dispense: 45 g; Refill: 0       I have had an Advance Directives discussion with the patient.    Follow up: 1 year for physical, sooner as needed    Kelsey Serna MD  [facility]      Subjective:     Elidia Monterroso is a 61 year old female who presents for an annual exam.     Answers for HPI/ROS submitted by the patient on 4/4/2022  Frequency of exercise:: 2-3 days/week  Getting at least 3 servings of Calcium per day:: Yes  Diet:: Regular (no restrictions)  Taking medications regularly:: Yes  Medication side effects::  None  Bi-annual eye exam:: NO  Dental care twice a year:: NO  Sleep apnea or symptoms of sleep apnea:: None  Additional concerns today:: No  Duration of exercise:: 15-30 minutes    Health Maintenance reviewed:  Lipid Profile: due  Glucose Screen: due  Colonoscopy: up to date  Mammogram: up to date    Gynecologic History  No LMP recorded. postmenopausal  Last Pap: 2021. Results were: ASCUS with pos other HPV > colp showed HPV changes and chronic cervicitis - due today    Immunization History   Administered Date(s) Administered     COVID-19,PF,Pfizer (12+ Yrs) 04/17/2021, 05/08/2021, 12/03/2021     FLU 6-35 months 04/03/1998     Flu, Unspecified 11/01/2020     HepB-Adult 06/01/2000, 08/02/2000, 01/15/2001     Influenza Quad, Recombinant, pf(RIV4) (Flublok) 12/15/2021     Influenza Vaccine, 6+MO IM (QUADRIVALENT W/PRESERVATIVES) 01/23/2019, 11/06/2019     TD (ADULT, 7+) 04/03/1998, 01/23/2019     Tdap (Adacel,Boostrix) 04/14/2008     Immunization status: COVID booster given today, patient will check on shingles coverage.    Current Outpatient Medications   Medication Sig Dispense Refill     tretinoin (RETIN-A) 0.05 % external cream Apply topically nightly as needed (skin condition) 45 g 0     b complex vitamins tablet [B COMPLEX VITAMINS TABLET] Take 1 tablet by mouth once.       bimatoprost (LATISSE) 0.03 % ophthalmic solution [BIMATOPROST (LATISSE) 0.03 % OPHTHALMIC SOLUTION] APPLY 1 DROP EVENLY ALONG THE SKIN OF THE UPPER EYELID AT BASE OF EYELASHES. USE NIGHTLY 5 mL 1     cetirizine (ZYRTEC) 10 MG tablet [CETIRIZINE (ZYRTEC) 10 MG TABLET] Take 10 mg by mouth daily.       cholecalciferol, vitamin D3, 25 mcg (1,000 unit) capsule [CHOLECALCIFEROL, VITAMIN D3, 25 MCG (1,000 UNIT) CAPSULE] Take 1,000 Units by mouth once.       fish oil-omega-3 fatty acids 300-1,000 mg capsule [FISH OIL-OMEGA-3 FATTY ACIDS 300-1,000 MG CAPSULE] Take 1 g by mouth daily.       glucosamine-chondroitin 500-400 mg cap [GLUCOSAMINE-CHONDROITIN  500-400 MG CAP] Take 1 capsule by mouth 3 (three) times a day.       History reviewed. No pertinent past medical history.  Past Surgical History:   Procedure Laterality Date     CONIZATION       DILATION AND CURETTAGE       Patient has no known allergies.  Family History   Problem Relation Age of Onset     Diabetes Mother      Parkinsonism Mother      Other - See Comments Mother         shingles     Valvular heart disease Mother         s/p surg     Arrhythmia Mother      Bone Cancer Father 53.00     Lymphoma Maternal Grandmother 70.00     Other - See Comments Maternal Grandfather 98.00        shingles     Stomach Cancer Paternal Grandmother 77.00     Cancer Paternal Grandfather 65.00     Cerebrovascular Disease Paternal Uncle 80.00     Coronary Artery Disease Paternal Aunt 85.00     No Known Problems Daughter      Social History     Socioeconomic History     Marital status: Single     Spouse name: Not on file     Number of children: Not on file     Years of education: Not on file     Highest education level: Not on file   Occupational History     Not on file   Tobacco Use     Smoking status: Former Smoker     Packs/day: 1.00     Years: 30.00     Pack years: 30.00     Types: Cigarettes     Quit date: 2002     Years since quittin.3     Smokeless tobacco: Never Used   Substance and Sexual Activity     Alcohol use: Yes     Drug use: Not Currently     Sexual activity: Yes     Partners: Male     Birth control/protection: Pill   Other Topics Concern     Not on file   Social History Narrative     Not on file     Social Determinants of Health     Financial Resource Strain: Not on file   Food Insecurity: Not on file   Transportation Needs: Not on file   Physical Activity: Not on file   Stress: Not on file   Social Connections: Not on file   Intimate Partner Violence: Not on file   Housing Stability: Not on file       Review of Systems negative unless noted    Objective:        Vitals:    22 0932   BP:  "114/84   Pulse: 64   SpO2: 100%   Weight: 65.3 kg (144 lb)   Height: 1.632 m (5' 4.25\")     Body mass index is 24.53 kg/m .    Physical Exam:  General Appearance: Alert, pleasant, appears stated age  Head: Normocephalic, without obvious abnormality  Eyes: PERRL, conjunctiva/corneas clear, EOM's intact  Ears: Normal TM's and external ear canals, both ears  Neck: Supple,without lymphadenopathy, no thyromegaly or nodules noted  Lungs: Clear to auscultation bilaterally, respirations unlabored, no wheezing or crackles  Heart: Regular rate and rhythm, no murmur   Breast:  Normal appearance.  No palpable masses, nipple discharge, or skin changes  Abdomen: Soft, non-tender, no masses, no organomegaly  Extremities: Extremities with strong and symmetric pulses, no cyanosis or edema  Skin: Skin color, texture normal, no rashes or lesions  Neurologic: Grossly normal, no focal deficits  Pelvic exam: Mild atrophic changes of external genitalia, normal-appearing cervix, no discharge    "

## 2022-05-12 LAB — HCV AB SERPL QL IA: NONREACTIVE

## 2022-05-13 LAB
HUMAN PAPILLOMA VIRUS 16 DNA: NEGATIVE
HUMAN PAPILLOMA VIRUS 18 DNA: NEGATIVE
HUMAN PAPILLOMA VIRUS FINAL DIAGNOSIS: NORMAL
HUMAN PAPILLOMA VIRUS OTHER HR: NEGATIVE

## 2022-05-18 ENCOUNTER — PATIENT OUTREACH (OUTPATIENT)
Dept: FAMILY MEDICINE | Facility: CLINIC | Age: 62
End: 2022-05-18
Payer: COMMERCIAL

## 2022-05-18 LAB
BKR LAB AP GYN ADEQUACY: NORMAL
BKR LAB AP GYN INTERPRETATION: NORMAL
BKR LAB AP HPV REFLEX: NORMAL
BKR LAB AP PREVIOUS ABNL DX: NORMAL
BKR LAB AP PREVIOUS ABNORMAL: NORMAL
PATH REPORT.COMMENTS IMP SPEC: NORMAL
PATH REPORT.COMMENTS IMP SPEC: NORMAL
PATH REPORT.RELEVANT HX SPEC: NORMAL

## 2022-08-24 ENCOUNTER — MYC MEDICAL ADVICE (OUTPATIENT)
Dept: FAMILY MEDICINE | Facility: CLINIC | Age: 62
End: 2022-08-24

## 2022-08-24 ENCOUNTER — OFFICE VISIT (OUTPATIENT)
Dept: FAMILY MEDICINE | Facility: CLINIC | Age: 62
End: 2022-08-24
Payer: COMMERCIAL

## 2022-08-24 VITALS
WEIGHT: 146.6 LBS | DIASTOLIC BLOOD PRESSURE: 78 MMHG | RESPIRATION RATE: 18 BRPM | HEART RATE: 69 BPM | OXYGEN SATURATION: 97 % | BODY MASS INDEX: 24.97 KG/M2 | TEMPERATURE: 98.4 F | SYSTOLIC BLOOD PRESSURE: 112 MMHG

## 2022-08-24 DIAGNOSIS — M54.41 ACUTE BILATERAL LOW BACK PAIN WITH BILATERAL SCIATICA: Primary | ICD-10-CM

## 2022-08-24 DIAGNOSIS — M54.42 ACUTE BILATERAL LOW BACK PAIN WITH BILATERAL SCIATICA: Primary | ICD-10-CM

## 2022-08-24 PROCEDURE — 99213 OFFICE O/P EST LOW 20 MIN: CPT | Performed by: NURSE PRACTITIONER

## 2022-08-24 RX ORDER — NAPROXEN 500 MG/1
500 TABLET ORAL 2 TIMES DAILY WITH MEALS
COMMUNITY
Start: 2022-08-24 | End: 2023-12-21

## 2022-08-24 RX ORDER — CYCLOBENZAPRINE HCL 10 MG
10 TABLET ORAL 3 TIMES DAILY PRN
Qty: 30 TABLET | Refills: 0 | Status: SHIPPED | OUTPATIENT
Start: 2022-08-24 | End: 2023-12-21

## 2022-08-24 ASSESSMENT — ENCOUNTER SYMPTOMS: BACK PAIN: 1

## 2022-08-24 ASSESSMENT — PAIN SCALES - GENERAL: PAINLEVEL: SEVERE PAIN (6)

## 2022-08-24 NOTE — LETTER
August 24, 2022      Elidia Monterroso  [address] [address]        To Whom It May Concern:    Elidia Monterroso was seen in our clinic. It is best that she does not do lifting, pulling, pushing more than 20lbs for the following 48 hours.       Sincerely,        HERMAN Morales CNP

## 2022-08-24 NOTE — PROGRESS NOTES
Assessment & Plan     Acute bilateral low back pain with bilateral sciatica  Discussed RICE, stretching and strengthening for current and future use. Advised naproxen otc (1-2) tablets BID for the following 10-14 days to help with inflammation. Flexeril may be used 3x/day no etoh or driving while taking; best at bedtime. PT strongly advised. Will await final xray reading.     - cyclobenzaprine (FLEXERIL) 10 MG tablet; Take 1 tablet (10 mg) by mouth 3 times daily as needed for muscle spasms  - XR Lumbar Spine 2/3 Views; Future  - Physical Therapy Referral; Future    Advised to f/u in 1-2 weeks if symptoms do not improve and/or worsen. F/u with PCP for continued management.    HERMAN Morales CNP  [facility] DB Brenner is a 61 year old presenting for the following health issues:  Back Pain (Would like to get an order for XR or MRI for low back. Has been having ssx for approx 2 months without injury or trauma.)           Pain History:  When did you first notice your pain? - Acute Pain   Have you seen anyone else for your pain? No  Where in your body do you have pain? Back Pain  Onset/Duration: about 2 months  Description:   Location of pain: low back bilateral, little more on the left  Character of pain: cramping, tightening  Pain radiation: radiates into the left leg  New numbness or weakness in legs, not attributed to pain: no   Intensity: Currently 6/10  Progression of Symptoms: worsening  History:   Specific cause: none  Pain interferes with job: No  History of back problems: no prior back problems  Any previous MRI or X-rays: None  Sees a specialist for back pain: No  Alleviating factors:   Improved by: NSAIDs    Precipitating factors:  Worsened by: Nothing  Therapies tried and outcome: ibuprofen    Accompanying Signs & Symptoms:  Risk of Fracture: None  Risk of Cauda Equina: None  Risk of Infection: None  Risk of Cancer: None  Risk of Ankylosing Spondylitis: Onset at age  <35, male, AND morning back stiffness N/A      Patient states she works at [employer] and lifts heavy objects while working. States it started to worsen recently and feels sciatic going down b/l legs. She is still working and lifting heavy objects. Taking 3 tablets of ibuprofen every 4-5 hours states it does help with the pain. Has worsened since two months ago; once waking up, walking and stretching it does improve. Has not had any therapy's done.     Review of Systems   Musculoskeletal: Positive for back pain.      Constitutional, HEENT, cardiovascular, pulmonary, gi and gu systems are negative, except as otherwise noted.      Objective    /78 (BP Location: Right arm, Patient Position: Sitting, Cuff Size: Adult Large)   Pulse 69   Temp 98.4  F (36.9  C) (Oral)   Resp 18   Wt 66.5 kg (146 lb 9.6 oz)   LMP 03/15/2021 (Approximate)   SpO2 97%   BMI 24.97 kg/m    Body mass index is 24.97 kg/m .  Physical Exam   GENERAL: healthy, alert and no distress  RESP: lungs clear to auscultation - no rales, rhonchi or wheezes  CV: regular rate and rhythm, normal S1 S2, no S3 or S4, no murmur, click or rub, no peripheral edema and peripheral pulses strong  MS: no gross musculoskeletal defects noted, no edema. No swelling or erythema noted. Decreased ROM in left leg>right leg. Unable to stand on one leg without discomfort, moving leg side to side and forward and back while standing causes discomfort as well.  Able to sit and stand without appearing uncomfortable and gait appears normal.     Xray - Reviewed and pending final read from radiologist.                 .  ..  Answers for HPI/ROS submitted by the patient on 8/24/2022  What is the reason for your visit today? : What to get a X-ray or mri referral for back, think it s a pinch nerve  How many servings of fruits and vegetables do you eat daily?: 2-3  On average, how many sweetened beverages do you drink each day (Examples: soda, juice, sweet tea, etc.  Do NOT count  diet or artificially sweetened beverages)?: 0  How many minutes a day do you exercise enough to make your heart beat faster?: 10 to 19  How many days a week do you exercise enough to make your heart beat faster?: 4  How many days per week do you miss taking your medication?: 0

## 2022-10-01 ENCOUNTER — HEALTH MAINTENANCE LETTER (OUTPATIENT)
Age: 62
End: 2022-10-01

## 2022-10-08 ENCOUNTER — HOSPITAL ENCOUNTER (EMERGENCY)
Facility: CLINIC | Age: 62
Discharge: HOME | End: 2022-10-08
Admitting: NURSE PRACTITIONER
Payer: COMMERCIAL

## 2022-10-08 ENCOUNTER — APPOINTMENT (OUTPATIENT)
Dept: RADIOLOGY | Facility: CLINIC | Age: 62
End: 2022-10-08
Payer: COMMERCIAL

## 2022-10-08 ENCOUNTER — NURSE TRIAGE (OUTPATIENT)
Dept: NURSING | Facility: CLINIC | Age: 62
End: 2022-10-08

## 2022-10-08 ENCOUNTER — OFFICE VISIT (OUTPATIENT)
Dept: FAMILY MEDICINE | Facility: CLINIC | Age: 62
End: 2022-10-08
Payer: COMMERCIAL

## 2022-10-08 VITALS
HEART RATE: 89 BPM | BODY MASS INDEX: 24.87 KG/M2 | WEIGHT: 146 LBS | TEMPERATURE: 97.6 F | SYSTOLIC BLOOD PRESSURE: 145 MMHG | RESPIRATION RATE: 16 BRPM | DIASTOLIC BLOOD PRESSURE: 90 MMHG | OXYGEN SATURATION: 100 %

## 2022-10-08 VITALS
OXYGEN SATURATION: 98 % | TEMPERATURE: 97.1 F | RESPIRATION RATE: 18 BRPM | SYSTOLIC BLOOD PRESSURE: 133 MMHG | HEART RATE: 94 BPM | DIASTOLIC BLOOD PRESSURE: 94 MMHG

## 2022-10-08 DIAGNOSIS — S00.83XA CONTUSION OF FACE, INITIAL ENCOUNTER: ICD-10-CM

## 2022-10-08 DIAGNOSIS — S01.511A LIP LACERATION, INITIAL ENCOUNTER: Primary | ICD-10-CM

## 2022-10-08 DIAGNOSIS — S52.122A LEFT RADIAL HEAD FRACTURE: ICD-10-CM

## 2022-10-08 DIAGNOSIS — S63.509A WRIST SPRAIN: ICD-10-CM

## 2022-10-08 DIAGNOSIS — S09.90XA INJURY OF HEAD, INITIAL ENCOUNTER: ICD-10-CM

## 2022-10-08 DIAGNOSIS — S62.113A TRIQUETRAL FRACTURE: ICD-10-CM

## 2022-10-08 DIAGNOSIS — T07.XXXA ABRASIONS OF MULTIPLE SITES: ICD-10-CM

## 2022-10-08 DIAGNOSIS — S59.902A ELBOW INJURY, LEFT, INITIAL ENCOUNTER: ICD-10-CM

## 2022-10-08 DIAGNOSIS — S60.229A CONTUSION OF HAND: ICD-10-CM

## 2022-10-08 DIAGNOSIS — S01.511A LIP LACERATION, INITIAL ENCOUNTER: ICD-10-CM
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
## 2022-10-08 PROCEDURE — 99214 OFFICE O/P EST MOD 30 MIN: CPT | Performed by: PHYSICIAN ASSISTANT

## 2022-10-08 PROCEDURE — 12013 RPR F/E/E/N/L/M 2.6-5.0 CM: CPT

## 2022-10-08 PROCEDURE — 73080 X-RAY EXAM OF ELBOW: CPT | Mod: LT

## 2022-10-08 PROCEDURE — 73110 X-RAY EXAM OF WRIST: CPT | Mod: 50

## 2022-10-08 PROCEDURE — 250N000009 HC RX 250: Performed by: NURSE PRACTITIONER

## 2022-10-08 PROCEDURE — 99285 EMERGENCY DEPT VISIT HI MDM: CPT | Mod: 25

## 2022-10-08 PROCEDURE — 29125 APPL SHORT ARM SPLINT STATIC: CPT | Mod: RT,59

## 2022-10-08 PROCEDURE — 24650 CLTX RDL HEAD/NCK FX WO MNPJ: CPT | Mod: LT

## 2022-10-08 RX ORDER — GINSENG 100 MG
CAPSULE ORAL ONCE
Status: COMPLETED | OUTPATIENT
Start: 2022-10-08 | End: 2022-10-08

## 2022-10-08 RX ADMIN — BACITRACIN 1 APPLICATION.: 500 OINTMENT TOPICAL at 13:57

## 2022-10-08 ASSESSMENT — ENCOUNTER SYMPTOMS
NEUROLOGICAL NEGATIVE: 1
WOUND: 1
CONSTITUTIONAL NEGATIVE: 1
MUSCULOSKELETAL NEGATIVE: 1

## 2022-10-08 ASSESSMENT — ACTIVITIES OF DAILY LIVING (ADL): ADLS_ACUITY_SCORE: 33

## 2022-10-08 NOTE — TELEPHONE ENCOUNTER
Dog pulled her down two stairs and hit mouth. Is bleeding quite a bit and thinks she needs stiches. Would like to know where the nearest [facility] is to her.    Pt provided with location and hours for [facility].    Maria Guadalupe Ambrosio, RN, BSN  [facility]   Triage Nurse Advisor        Reason for Disposition    General information question, no triage required and triager able to answer question    Additional Information    Negative: [1] Caller is not with the adult (patient) AND [2] reporting urgent symptoms    Negative: Lab result questions    Negative: Medication questions    Negative: Caller can't be reached by phone    Negative: Caller has already spoken to PCP or another triager    Negative: RN needs further essential information from caller in order to complete triage    Negative: Requesting regular office appointment    Negative: [1] Caller requesting NON-URGENT health information AND [2] PCP's office is the best resource    Negative: Health Information question, no triage required and triager able to answer question    Protocols used: INFORMATION ONLY CALL - NO TRIAGE-A-

## 2022-10-08 NOTE — DISCHARGE INSTRUCTIONS
We cleaned and closed the wound in the ER today.    No fracture was found in the left wrist.    There is a suspected fracture in the right wrist. Leave the splint in place until you follow up with orthopedics    We also noted a fracture of your left elbow (left radial head). Wear the sling. Avoid using your left arm.    TO CARE FOR THE WOUND AT HOME:  Apply antibiotic ointment to your wounds twice daily until healed.  You can take acetaminophen or ibuprofen for discomfort.  Read and follow label directions  Apply ice to your injuries for 20 minutes 3-4 times daily over the next 2 days.  Activity as tolerated.  Watch for signs of infection (redness, increasing pain or yellow drainage) and if they develop, come back to the Emergency Department immediately for treatment.  Follow up with orthopedics this next week for further care. Call to schedule.     GET PROMPT MEDICAL ATTENTION if any of the following occur:  Repeated vomiting  Severe or worsening headache or dizziness  Unusual drowsiness, or unable to awaken as usual  Confusion or change in behavior or speech, memory loss, blurred vision  Convulsion (seizure)  Increasing scalp or face swelling  Redness, warmth or pus from the swollen area  Fluid drainage or bleeding from the nose or ears  ---------------------------------------------------------------------------------------------------

## 2022-10-08 NOTE — ED TRIAGE NOTES
Pt presents with laceration to upper lip after falling on dog. Pt also reports bilateral wrist pain. Denies LOC or thinners. ABCs intact.      Triage Assessment     Row Name 10/08/22 120       Triage Assessment (Adult)    Airway WDL WDL       Respiratory WDL    Respiratory WDL WDL       Skin Circulation/Temperature WDL    Skin Circulation/Temperature WDL WDL       Cardiac WDL    Cardiac WDL WDL       Peripheral/Neurovascular WDL    Peripheral Neurovascular WDL WDL       Cognitive/Neuro/Behavioral WDL    Cognitive/Neuro/Behavioral WDL WDL

## 2022-10-08 NOTE — ED PROVIDER NOTES
EMERGENCY DEPARTMENT ENCOUNTER      NAME: Elidia Monterroso  AGE: 61 year old female  YOB: 1960  MRN: [id number]  EVALUATION DATE & TIME: 10/8/2022 12:31 PM    PCP: Kelsey Serna    ED PROVIDER: HERMAN Veliz, CNP      Chief Complaint   Patient presents with     Fall     Laceration         FINAL IMPRESSION:  1. Injury of head, initial encounter    2. Contusion of face, initial encounter    3. Lip laceration, initial encounter    4. Wrist sprain    5. Contusion of hand    6. Elbow injury, left, initial encounter    7. Abrasions of multiple sites    8. Triquetral fracture    9. Left radial head fracture          ED COURSE & MEDICAL DECISION MAKIN:31 PM I met with the patient, obtained history, performed an initial exam, and discussed options and plan for treatment here in the ED.  1:26 PM laceration repair  2:18 PM updated patient. Splinted right wrist.    Pertinent Labs & Imaging studies reviewed. (See chart for details)  61 year old female presents to the Emergency Department for evaluation of injuries after falling.  Patient not anticoagulated.  No headache or neurodeficits.  Low suspicion for intracranial injury.  Decision made today to defer head CT.  No spinal tenderness on exam.  Was noted to have injuries to the left and right upper extremities along with facial injuries.  Laceration to the upper lip was surgically repaired.  Did not have any symptoms or exam findings to suggest facial fracture and no obvious dental injury.  Imaging of the upper extremities was notable for possible right triquetral fracture.  This was placed in a splint.  No fracture noted in the left wrist.  Left radial head fracture noted and she was given a sling.  Recommend that she follow-up this week with [facility] for ongoing care.  Take over-the-counter medications for any discomfort.  Recommend applying ice for 20 minutes 3-4 times daily over the next 2 days.  If she has any  new/worsening symptoms or other concerns, she was instructed to return to the ER for further care    At the conclusion of the encounter I discussed the results of all of the tests and the disposition. The questions were answered. The patient or family acknowledged understanding and was agreeable with the care plan.            MEDICATIONS GIVEN IN THE EMERGENCY:  Medications   bacitracin ointment (1 Application Topical Given 10/8/22 1194)       NEW PRESCRIPTIONS STARTED AT TODAY'S ER VISIT  New Prescriptions    No medications on file            =================================================================    HPI    Patient information was obtained from: patient    Use of Intrepreter: N/A        Elidia Monterroso is a 61 year old female who  has a past medical history of Bilateral hearing loss (1/17/2020), Mixed hyperlipidemia (1/17/2020), and Restless legs syndrome (RLS) (1/17/2020) and presents today for evaluations of injuries after falling. She fell around 9:30 AM due to her dog. She fell down forward down 2 steps. Notes injuries to her wrists, left elbow and face.  No loss of consciousness, headache, nausea, vomiting, neck pain, worsening of back pain, or other injuries or concerns.  Last tetanus 2019.  Sent from urgent care for evaluation.      REVIEW OF SYSTEMS   All systems reviewed and negative except as noted in HPI.         PAST MEDICAL HISTORY:  Past Medical History:   Diagnosis Date     Bilateral hearing loss 1/17/2020    Hearing aids 2018      Mixed hyperlipidemia 1/17/2020     Restless legs syndrome (RLS) 1/17/2020       PAST SURGICAL HISTORY:  Past Surgical History:   Procedure Laterality Date     CONIZATION  1985     DILATION AND CURETTAGE  1985           CURRENT MEDICATIONS:    No current facility-administered medications for this encounter.     Current Outpatient Medications   Medication     b complex vitamins tablet     bimatoprost (LATISSE) 0.03 % ophthalmic solution     cetirizine (ZYRTEC) 10  MG tablet     cholecalciferol, vitamin D3, 25 mcg (1,000 unit) capsule     cyclobenzaprine (FLEXERIL) 10 MG tablet     fish oil-omega-3 fatty acids 300-1,000 mg capsule     glucosamine-chondroitin 500-400 mg cap     naproxen (NAPROSYN) 500 MG tablet     tretinoin (RETIN-A) 0.05 % external cream         ALLERGIES:  No Known Allergies    FAMILY HISTORY:  Family History   Problem Relation Age of Onset     Diabetes Mother      Parkinsonism Mother      Other - See Comments Mother         shingles     Valvular heart disease Mother         s/p surg     Arrhythmia Mother      Bone Cancer Father 53.00     Lymphoma Maternal Grandmother 70.00     Other - See Comments Maternal Grandfather 98.00        shingles     Stomach Cancer Paternal Grandmother 77.00     Cancer Paternal Grandfather 65.00     Cerebrovascular Disease Paternal Uncle 80.00     Coronary Artery Disease Paternal Aunt 85.00     No Known Problems Daughter        SOCIAL HISTORY:   Social History     Socioeconomic History     Marital status: Single   Tobacco Use     Smoking status: Former Smoker     Packs/day: 1.00     Years: 30.00     Pack years: 30.00     Types: Cigarettes     Quit date: 2002     Years since quittin.7     Smokeless tobacco: Never Used   Substance and Sexual Activity     Alcohol use: Yes     Drug use: Not Currently     Sexual activity: Yes     Partners: Male     Birth control/protection: Pill         VITALS:  Patient Vitals for the past 24 hrs:   BP Temp Temp src Pulse Resp SpO2   10/08/22 1209 (!) 143/95 -- -- -- -- --   10/08/22 1207 -- 97.1  F (36.2  C) Temporal 82 16 99 %       PHYSICAL EXAM    Constitutional:  Well developed, well nourished, no acute distress. GCS 15  EYES: Conjunctivae clear. PERRL. EOM's intact.  HENT: Contusion chin, left cheek.  Small nasal abrasion.  No septal hematoma.  TMs normal without hemotympanum.  No hawkins signs or raccoon eyes. 3 CM laceration mid upper lip that crosses the [place]  border.  Respiratory:  No respiratory distress  Musculoskeletal: Mild tenderness with palpation of both wrists.  Mild left elbow tenderness.  Left elbow abrasion.  Integument: Warm, Dry.  Facial laceration as noted above.  Neurologic:  Alert & oriented x 3. CN 3-12 grossly intact.              LAB:  All pertinent labs reviewed and interpreted.  Labs Ordered and Resulted from Time of ED Arrival to Time of ED Departure - No data to display      RADIOLOGY:  Reviewed all pertinent imaging. Please see official radiology report.  Elbow  XR, G/E 3 views, left   Final Result   IMPRESSION: Large elbow joint effusion. Acute nondisplaced mildly impacted fracture of the radial neck. The radial head articular cortical surface appears intact. Normal left elbow joint alignment.      XR Wrist Left G/E 3 Views   Final Result   IMPRESSION: Severe degenerative arthritic changes at the base of the thumb in the first CMC joint. Mild degenerative changes in the thumb MCP and STT joints. Normal joint alignment and spacing in the left wrist elsewhere. No fracture, erosion, or bone    lesion. Normal soft tissues.      XR Wrist Right G/E 3 Views   Final Result   IMPRESSION: Age indeterminant nondisplaced dorsal triquetral fracture, visible on the lateral view. On the oblique projection, there is linear lucency overlying the ulnar styloid, which is favored to represent a summation shadow, with no adjacent soft    tissue swelling present. Given correlation with an absence of focal pain and tenderness at the radius. Advanced degenerative arthritic changes at the base of the thumb and the first CMC joint. No acute or significant bone or joint abnormality elsewhere.                PROCEDURES:   PROCEDURE: Laceration Repair   INDICATIONS: Laceration   PROCEDURE PROVIDER: HERMAN Ackerman CNP    SITE: Upper lip   TYPE/SIZE: complex, clean and no foreign body visualized  3 cm (total length)   FUNCTIONAL ASSESSMENT: Distal sensation,  circulation and motor intact   MEDICATION: 2 mLs of 1% Lidocaine with epinephrine   PREPARATION: scrubbing and irrigation with Normal saline and Betadine   DEBRIDEMENT: no debridement and wound explored, no foreign body found   CLOSURE:  Superficial layer closed with 5 stitches of 5-0 Fast Absorbing simple interrupted    Total number of sutures/staples placed: 5         PROCEDURE: Splint Placement   INDICATIONS: right Triquetral fracture   PROCEDURE PROVIDER: Lennox Baeza CNP   NOTE:  A Premolded aluminum padded splint splint made of was applied to the right wrist by the above provider. As noted in the physical exam, distal CMS was intact prior to placement. The splint was checked and the fit was adjusted to ensure proper positioning after placement. Sensation and circulation, as well as motor function, are unchanged after splint placement and the patient is more comfortable with the splint in place.           HERMAN Veliz, CNP  Emergency Medicine  [facility] EMERGENCY ROOM  [address] [place] [id number]  [phone]  Dept: [phone]         Lennox Baeza APRN CNP  10/08/22 1421

## 2022-10-08 NOTE — PROGRESS NOTES
Assessment & Plan     Lip laceration, initial encounter    Accidental fall, initial encounte       Laceration through vermillion border requiring dermatologist/plastic surgery consult. Instructed to go to ED immediately.     Subjective     Elidia is a 61 year old female who presents to clinic today with daughter for the following health issues:  Chief Complaint   Patient presents with     Fall     Fell on hit lip, both arms, check on left eye redness possible pink eye      Elidia presents with reports of injury to her lip and left arm this AM. 0915 this AM dog pulled her down 2 steps and hurt lip and left elbow. She denies loss of consciousness, nausea, vomiting, amnesia.           Review of Systems   Constitutional: Negative.    Musculoskeletal: Negative.    Skin: Positive for wound.   Neurological: Negative.            Objective    BP (!) 145/90   Pulse 89   Temp 97.6  F (36.4  C) (Oral)   Resp 16   Wt 66.2 kg (146 lb)   SpO2 100%   BMI 24.87 kg/m    Physical Exam  Constitutional:       Appearance: Normal appearance.   HENT:      Head: Normocephalic and atraumatic.   Musculoskeletal:      Cervical back: Normal range of motion and neck supple.   Skin:     General: Skin is warm and dry.      Comments: Lip laceration through vermillion border, completely through lip   Neurological:      General: No focal deficit present.      Mental Status: She is alert and oriented to person, place, and time.              Magdy Duarte PA-C

## 2022-10-10 ENCOUNTER — TRANSFERRED RECORDS (OUTPATIENT)
Dept: HEALTH INFORMATION MANAGEMENT | Facility: CLINIC | Age: 62
End: 2022-10-10

## 2022-11-01 ENCOUNTER — TRANSFERRED RECORDS (OUTPATIENT)
Dept: HEALTH INFORMATION MANAGEMENT | Facility: CLINIC | Age: 62
End: 2022-11-01

## 2022-11-11 ENCOUNTER — HOSPITAL ENCOUNTER (OUTPATIENT)
Dept: PHYSICAL THERAPY | Facility: REHABILITATION | Age: 62
Discharge: HOME | End: 2022-11-11
Payer: COMMERCIAL

## 2022-11-11 DIAGNOSIS — M54.42 ACUTE BILATERAL LOW BACK PAIN WITH BILATERAL SCIATICA: ICD-10-CM

## 2022-11-11 DIAGNOSIS — M54.41 ACUTE BILATERAL LOW BACK PAIN WITH BILATERAL SCIATICA: ICD-10-CM

## 2022-11-11 PROCEDURE — 97110 THERAPEUTIC EXERCISES: CPT | Mod: GP

## 2022-11-11 PROCEDURE — 97161 PT EVAL LOW COMPLEX 20 MIN: CPT | Mod: GP

## 2022-11-11 NOTE — PROGRESS NOTES
"   11/11/22 0900   General Information   Type of Visit Initial OP Ortho PT Evaluation   Start of Care Date 11/11/22   Referring Physician HERMAN Morales CNP   Patient/Family Goals Statement \"Help subside pain\"   Orders Evaluate and Treat   Date of Order 08/24/22   Certification Required? No   Medical Diagnosis Acute bilateral low back pain with bilateral sciatica   Surgical/Medical history reviewed Yes   Presentation and Etiology   Pertinent history of current problem (include personal factors and/or comorbidities that impact the POC) Elidia reports that in July of this year, she started to central low back pain without any apparent mechanism of injury. Over time, she started to get pain into left > right buttocks, and that gradually spread to down her left > right legs usually stopping proximal to the knee, but occasionally going down to the foot. She also reports muscle spasms in her bilateral buttocks, but she started taking magnesium supplements, and that has helped reduce the spasms significantly. She also reports a fall on 10/8/22 when her dog pulled her down two stairs. She went to the ER with a cut lip and and bilateral arm pain. They confirmed a break in the left elbow and right wrist, which she had a brace for. She feels like those are all healing well. She thinks that her back problems continued to her dog pulling her off balance. She had another fall not long after as she was running to catch her dog, and she tripped and fell. She said that she just couldn't run normal. She denies any bowel or bladder changes, though she does say that bowel movements are slightly more painful than before. She does report some tingling in the left > right leg in the same distribution as her pain. She also noted numbness in the left leg one day after extended standing at work.   Impairments A. Pain;D. Decreased ROM;E. Decreased flexibility;F. Decreased strength and endurance;G. Impaired balance;H. Impaired gait;K. " Numbness;L. Tingling   Functional Limitations perform activities of daily living;perform required work activities;perform desired leisure / sports activities   Symptom Location See history   How/Where did it occur From insidious onset   Onset date of current episode/exacerbation 07/22/22   Chronicity New   Pain rating (0-10 point scale) Best (/10);Worst (/10);Other   Best (/10) 0/10   Worst (/10) 10/10   Pain rating comment Current: 2/10   Pain quality G. Cramping;E. Shooting   Frequency of pain/symptoms C. With activity   Pain/symptoms are: Worse in the morning   Pain/symptoms exacerbated by A. Sitting;I. Bending;C. Lifting;D. Carrying;L. Work tasks;J. ADL;K. Home tasks;G. Certain positions   Pain/symptoms eased by I. OTC medication(s);B. Walking;K. Other;E. Changing positions;C. Rest   Pain eased by comment Stretching   Progression of symptoms since onset: Improved   Current / Previous Interventions   Diagnostic Tests: X-ray   X-ray Results Results   X-ray results IMPRESSION: Five lumbar type vertebrae. Mild right convex curvature of  the lumbar spine centered at L3. Grade 1 degenerative anterolisthesis  of L3 upon L4. Alignment otherwise normal. Vertebral body heights  normal. No fractures. Facet arthropathy at L3-L4, L4-L5 and L5-S1.   Prior Level of Function   Prior Level of Function-Mobility Independent   Prior Level of Function-ADLs Independent   Fall Risk Screen   Fall screen completed by PT   Have you fallen 2 or more times in the past year? Yes   Have you fallen and had an injury in the past year? Yes   Is patient a fall risk? Yes   Fall screen comments Patient reports being a little less confident running and walking her dog since onset of back and leg symptoms.   Abuse Screen (yes response referral indicated)   Feels Unsafe at Home or Work/School no   Feels Threatened by Someone no   Does Anyone Try to Keep You From Having Contact with Others or Doing Things Outside Your Home? no   Physical Signs of  Abuse Present no   Patient needs abuse support services and resources No   System Outcome Measures   Outcome Measures   (KEVEN: 32%)   Lumbar Spine/SI Objective Findings   Gait/Locomotion Slightly widened base of support. Genu valgum.   Flexion ROM Toes   Extension ROM WFL   Right Side Bending ROM Knee (pain at endrange)   Left Side Bending ROM Knee (pain at endrange)   Lumbar ROM Comment Bilateral rotation: WFL (painful at endrange)   Pelvic Screen No apparent upslip/downslip, rotation, or leg length discrepancy.   Hip Screen Bilateral hip flexion, IR, and ER: WFL   Hip Flexion (L2) Strength Bilateral: 5/5   Hip Abduction Strength Bilateral: 4+/5   Hip Adduction Strength Bilateral: 5/5   Knee Flexion Strength Bilateral: 5/5   Knee Extension (L3) Strength Bilateral: 5/5   Ankle Dorsiflexion (L4) Strength Bilateral: 5/5   Great Toe Extension (L5) Strength Right: 5/5. Left: 3+/5.   Ankle Plantar Flexion (S1) Strength Bilateral: 5/5.   Hamstring Flexibility Min limited bilateral   Quadricep Flexibility Min limited bilateral   Piriformis Flexibility Left > right limited   SLR Positive on left at 65 degrees   Crossover SLR Right SLR reproduced left leg symptoms at 60 degrees   Slump Test Negative bilateral   Segmental Mobility Min to mod hypomobile to lumbar PA mobilizations with slight reproduction of leg symptoms   Sensation Testing Bilateral lower extremity dermatomes intact to light touch   Patellar Tendon Reflexes  Bilateral: 1+   Achilles Tendon Reflexes Bilateral: 0   Neurological Testing Comments Ankle clonus and Sandy's: negative bilaterally. Prone femoral nerve tension test: negative.   Palpation Tender to palpation at left posterolateral hip   Planned Therapy Interventions   Planned Therapy Interventions balance training;joint mobilization;manual therapy;neuromuscular re-education;ROM;strengthening;stretching   Planned Modality Interventions   Planned Modality Interventions Cryotherapy;Electrical  stimulation;Hot packs;TENS   Clinical Impression   Criteria for Skilled Therapeutic Interventions Met yes, treatment indicated   PT Diagnosis Low back pain with radicular symptoms   Influenced by the following impairments Low back and shooting buttocks/leg pain, decreased flexibility, decreased strength, decreased joint mobility, and positive neural tension testing   Functional limitations due to impairments Bend, twist, lift, carry, stand, sit, and other ADLs and work/leisure tasks   Clinical Presentation Stable/Uncomplicated   Clinical Decision Making (Complexity) Low complexity   Therapy Frequency 1 time/week   Predicted Duration of Therapy Intervention (days/wks) 10 weeks   Risk & Benefits of therapy have been explained Yes   Patient, Family & other staff in agreement with plan of care Yes   Clinical Impression Comments Elidia is a 61-year-old female who presents to physical therapy with signs and symptoms consistent with low back pain with radicular symptoms, including low back and shooting left > right buttocks/leg pain, decreased flexibility, decreased strength, decreased joint mobility, and positivie nerual tension testing. These impairments limit her ability to bend, twist, lift, carry, stand, sit, and perform other ADLs and leisure/work activities without pain or limitation. She also reports decreased confidence and function with running and walking her dog due to back and leg symptoms, and this increases her risk of falls, though she is able to walk safely and independently at evaluation today. She will benefit from skilled therapy to address the listed impairments and limitations. PT recommended 1 visit per week up to 10 weeks, but patient verbalized desire to limit visits, so she said she would follow up with PT in 3-4 weeks to assess progress.   Ortho Goal 1   Goal Identifier HEP   Goal Description Elidia will demonstrate mastery of and compliance with home exercises to faciliate effective healing.    Goal Progress In progress   Target Date 12/09/22   Ortho Goal 2   Goal Identifier KEVEN   Goal Description Elidia will demonstrate improved function as evidenced by her improved (decreased) KEVEN score of less than 3%.   Goal Progress 32%   Target Date 01/20/23   Ortho Goal 3   Goal Identifier Work   Goal Description Elidia will be able to tolerate all required work activities without restrictions/modifications and self-report pain no higher than 3/10.   Goal Progress Unable   Target Date 01/20/23   Ortho Goal 4   Goal Identifier Running   Goal Description Elidia will be able to run up to 20 minutes safely and independently with no increase in symptoms.   Goal Progress Unable   Target Date 01/20/23   Total Evaluation Time   PT Eval, Low Complexity Minutes (11074) 35

## 2022-12-09 NOTE — PROGRESS NOTES
[facility]    Outpatient Physical Therapy Discharge Note  Patient: Elidia Monterroso  : 1960    Beginning/End Dates of Reporting Period:  22 to 22    Referring Provider: HERMAN Morales CNP    Therapy Diagnosis: Low back pain with radicular symptoms     Client Self Report: Elidia reports that in July of this year, she started to central low back pain without any apparent mechanism of injury. Over time, she started to get pain into left > right buttocks, and that gradually spread to down her left > right legs usually stopping proximal to the knee, but occasionally going down to the foot. She also reports muscle spasms in her bilateral buttocks, but she started taking magnesium supplements, and that has helped reduce the spasms significantly. She also reports a fall on 10/8/22 when her dog pulled her down two stairs. She went to the ER with a cut lip and and bilateral arm pain. They confirmed a break in the left elbow and right wrist, which she had a brace for. She feels like those are all healing well. She thinks that her back problems continued to her dog pulling her off balance. She had another fall not long after as she was running to catch her dog, and she tripped and fell. She said that she just couldn't run normal. She denies any bowel or bladder changes, though she does say that bowel movements are slightly more painful than before. She does report some tingling in the left > right leg in the same distribution as her pain. She also noted numbness in the left leg one day after extended standing at work.    Objective Measurements:  Objective Measure: Hip Adduction Strength  Details: Bilateral: 4+/5  Objective Measure: Great Toe Extension Strength  Details: Right: 5/5. Left: 3+/5.  Objective Measure: Straight Leg Raise  Details: Positive on left at 65 degrees  Objective Measure:  Crossover Straight Leg Raise  Details: Positive on right at 60 degrees  Objective Measure: Worst Pain  Details: 10/10  Objective Measure: Current Pain  Details: 2/10      Outcome Measures (most recent score):  KEVEN: 32%    Goals:  Goal Identifier HEP   Goal Description Elidia will demonstrate mastery of and compliance with home exercises to faciliate effective healing.   Target Date 12/09/22   Date Met      Progress (detail required for progress note): In progress     Goal Identifier KEVEN   Goal Description Elidia will demonstrate improved function as evidenced by her improved (decreased) KEVEN score of less than 3%.   Target Date 01/20/23   Date Met      Progress (detail required for progress note): 32%     Goal Identifier Work   Goal Description Elidia will be able to tolerate all required work activities without restrictions/modifications and self-report pain no higher than 3/10.   Target Date 01/20/23   Date Met      Progress (detail required for progress note): Unable     Goal Identifier Running   Goal Description Elidia will be able to run up to 20 minutes safely and independently with no increase in symptoms.   Target Date 01/20/23   Date Met      Progress (detail required for progress note): Unable         Plan:  Discharge from therapy.    Discharge:    Reason for Discharge: Patient chooses to discontinue therapy and cancelled the remainder of her visits.    Equipment Issued: NA    Discharge Plan: PT was unable to discuss discharge planning with the patient as the discharge was unplanned.

## 2023-04-24 ENCOUNTER — PATIENT OUTREACH (OUTPATIENT)
Dept: FAMILY MEDICINE | Facility: CLINIC | Age: 63
End: 2023-04-24
Payer: COMMERCIAL

## 2023-08-12 ENCOUNTER — HEALTH MAINTENANCE LETTER (OUTPATIENT)
Age: 63
End: 2023-08-12

## 2023-08-14 ASSESSMENT — ENCOUNTER SYMPTOMS
HEMATOCHEZIA: 0
FREQUENCY: 0
EYE PAIN: 0
NAUSEA: 0
ARTHRALGIAS: 0
DYSURIA: 0
NERVOUS/ANXIOUS: 0
SHORTNESS OF BREATH: 0
HEMATURIA: 0
COUGH: 0
FEVER: 0
WEAKNESS: 0
DIZZINESS: 0
HEADACHES: 0
PALPITATIONS: 0
CONSTIPATION: 0
DIARRHEA: 0
MYALGIAS: 1
ABDOMINAL PAIN: 0
CHILLS: 0
JOINT SWELLING: 0
SORE THROAT: 0
BREAST MASS: 0
HEARTBURN: 0
PARESTHESIAS: 0

## 2023-08-14 NOTE — PROGRESS NOTES
Assessment/Plan:   Elidia is a 62 year old female here for physical with additional concern    Routine adult health maintenance  Physical completed today.  Up-to-date with immunizations, patient will get shingles vaccine at pharmacy.  Labs as below.  Pap smear completed today.  Mammogram due in November, ordered today.  Up-to-date with colon cancer screening.  - Basic metabolic panel  (Ca, Cl, CO2, Creat, Gluc, K, Na, BUN)  - CBC with platelets    Mixed hyperlipidemia  History of hyperlipidemia, not currently taking statin therapy, recheck levels today.  - Lipid panel reflex to direct LDL Non-fasting    Diabetes mellitus screening  Given age, will screen for diabetes today.  - Hemoglobin A1c    ASCUS with positive high risk HPV cervical  Cervical cancer screening  History of abnormal Pap smear, due for recheck today, cotesting completed.  - Pap Screen with HPV - recommended age 30 - 65 years    Acute bilateral low back pain with bilateral sciatica  Patient reports onset of low back pain 1 year ago, has been using Tylenol/ibuprofen and doing physical therapy but symptoms are worsening, x-ray showed degenerative changes 1 year ago, will check MRI now and consider referral to spine clinic pending results.  - MR Lumbar Spine w/o Contrast    Visit for screening mammogram  Due for mammogram in November, ordered today.  - *MA Screening Digital Bilateral     I have had an Advance Directives discussion with the patient.    Follow up: 1 year for physical, sooner as needed    Kelsey Serna MD  [facility]      Subjective:     Elidia Monterroso is a 62 year old female who presents for an annual exam.     Answers submitted by the patient for this visit:  Annual Preventive Visit (Submitted on 8/14/2023)  Chief Complaint: Annual Exam:  Frequency of exercise:: 4-5 days/week  Getting at least 3 servings of Calcium per day:: Yes  Diet:: Regular (no restrictions)  Taking medications regularly:: Yes  Medication side  effects:: None  Bi-annual eye exam:: NO  Dental care twice a year:: Yes  Sleep apnea or symptoms of sleep apnea:: None  abdominal pain: No  Blood in stool: No  Blood in urine: No  chest pain: No  chills: No  congestion: No  constipation: No  cough: No  diarrhea: No  dizziness: No  ear pain: No  eye pain: No  nervous/anxious: No  fever: No  frequency: No  genital sores: No  headaches: No  hearing loss: Yes  heartburn: No  arthralgias: No  joint swelling: No  peripheral edema: No  mood changes: No  myalgias: Yes  nausea: No  dysuria: No  palpitations: No  Skin sensation changes: No  sore throat: No  urgency: No  rash: No  shortness of breath: No  visual disturbance: No  weakness: No  pelvic pain: No  vaginal bleeding: No  vaginal discharge: No  tenderness: No  breast mass: No  breast discharge: No  Additional concerns today:: No  Exercise outside of work (Submitted on 8/14/2023)  Chief Complaint: Annual Exam:  Duration of exercise:: 45-60 minutes    Back pain - started July 2022 - was seen in Aug for this - saw PT and flexeril   -constant pain, some days worse than others, pain in buttocks L>R, tightening, legs falling asleep when walking    Health Maintenance reviewed:  Lipid Profile: yes  Glucose Screen: yes  Colonoscopy: up to date  Mammogram: up to date    Gynecologic History  No LMP recorded. Patient is perimenopausal.  Last Pap: 2022 - due for check today    Immunization History   Administered Date(s) Administered    COVID-19 Bivalent 12+ (Pfizer) 02/09/2023    COVID-19 MONOVALENT 12+ (Pfizer) 04/17/2021, 05/08/2021, 12/03/2021    COVID-19 Monovalent 12+ (Pfizer 2022) 05/11/2022    FLU 6-35 months 04/03/1998    Flu, Unspecified 11/01/2020    Hepatitis B, Adult 06/01/2000, 08/02/2000, 01/15/2001    Influenza Vaccine 50-64 or 18-64 w/egg allergy (Flublok) 12/15/2021    Influenza Vaccine >6 months (Alfuria,Fluzone) 12/02/2022    Influenza Vaccine, 6+MO IM (QUADRIVALENT W/PRESERVATIVES) 01/23/2019, 11/06/2019     TD,PF 7+ (Tenivac) 04/03/1998, 01/23/2019    TDAP (Adacel,Boostrix) 04/14/2008     Immunization status: up to date and documented.    Current Outpatient Medications   Medication Sig Dispense Refill    b complex vitamins tablet [B COMPLEX VITAMINS TABLET] Take 1 tablet by mouth once.      bimatoprost (LATISSE) 0.03 % ophthalmic solution [BIMATOPROST (LATISSE) 0.03 % OPHTHALMIC SOLUTION] APPLY 1 DROP EVENLY ALONG THE SKIN OF THE UPPER EYELID AT BASE OF EYELASHES. USE NIGHTLY 5 mL 1    cetirizine (ZYRTEC) 10 MG tablet [CETIRIZINE (ZYRTEC) 10 MG TABLET] Take 10 mg by mouth daily.      cholecalciferol, vitamin D3, 25 mcg (1,000 unit) capsule [CHOLECALCIFEROL, VITAMIN D3, 25 MCG (1,000 UNIT) CAPSULE] Take 1,000 Units by mouth once.      cyclobenzaprine (FLEXERIL) 10 MG tablet Take 1 tablet (10 mg) by mouth 3 times daily as needed for muscle spasms 30 tablet 0    fish oil-omega-3 fatty acids 300-1,000 mg capsule [FISH OIL-OMEGA-3 FATTY ACIDS 300-1,000 MG CAPSULE] Take 1 g by mouth daily.      glucosamine-chondroitin 500-400 mg cap [GLUCOSAMINE-CHONDROITIN 500-400 MG CAP] Take 1 capsule by mouth 3 (three) times a day.      magnesium 250 MG tablet Take 1 tablet by mouth daily      naproxen (NAPROSYN) 500 MG tablet Take 1 tablet (500 mg) by mouth 2 times daily (with meals)      tretinoin (RETIN-A) 0.05 % external cream Apply topically nightly as needed (skin condition) 45 g 0     Past Medical History:   Diagnosis Date    Bilateral hearing loss 1/17/2020    Hearing aids 2018     Mixed hyperlipidemia 1/17/2020    Restless legs syndrome (RLS) 1/17/2020     Past Surgical History:   Procedure Laterality Date    CONIZATION  1985    DILATION AND CURETTAGE  1985     Patient has no known allergies.  Family History   Problem Relation Age of Onset    Diabetes Mother     Parkinsonism Mother     Other - See Comments Mother         shingles    Valvular heart disease Mother         s/p surg    Arrhythmia Mother     Bone Cancer Father 53.00  "   Lymphoma Maternal Grandmother 70.00    Other - See Comments Maternal Grandfather 98.00        shingles    Stomach Cancer Paternal Grandmother 77.00    Cancer Paternal Grandfather 65.00    Cerebrovascular Disease Paternal Uncle 80.00    Coronary Artery Disease Paternal Aunt 85.00    No Known Problems Daughter      Social History     Socioeconomic History    Marital status: Single     Spouse name: Not on file    Number of children: Not on file    Years of education: Not on file    Highest education level: Not on file   Occupational History    Not on file   Tobacco Use    Smoking status: Former     Packs/day: 1.00     Years: 30.00     Pack years: 30.00     Types: Cigarettes     Quit date: 2002     Years since quittin.6    Smokeless tobacco: Never   Substance and Sexual Activity    Alcohol use: Yes    Drug use: Not Currently    Sexual activity: Yes     Partners: Male     Birth control/protection: Pill   Other Topics Concern    Not on file   Social History Narrative    Not on file     Social Determinants of Health     Financial Resource Strain: Not on file   Food Insecurity: Not on file   Transportation Needs: Not on file   Physical Activity: Not on file   Stress: Not on file   Social Connections: Not on file   Intimate Partner Violence: Not on file   Housing Stability: Not on file       Review of Systems negative unless noted    Objective:        Vitals:    23 1404   BP: 120/70   Pulse: 64   Resp: 18   Temp: 97.4  F (36.3  C)   TempSrc: Temporal   SpO2: 97%   Weight: 67.4 kg (148 lb 9.6 oz)   Height: 1.626 m (5' 4\")   PainSc: Mild Pain (2)   PainLoc: Low Back     Body mass index is 25.51 kg/m .    Physical Exam:  General Appearance: Alert, pleasant, appears stated age  Head: Normocephalic, without obvious abnormality  Eyes: PERRL, conjunctiva/corneas clear, EOM's intact  Ears: Normal TM's and external ear canals, both ears  Nose: Nares normal, septum midline,mucosa normal, no drainage  Throat: Lips, " mucosa, and tongue normal; teeth and gums normal; oropharynx is clear  Neck: Supple,without lymphadenopathy, no thyromegaly or nodules noted  Lungs: Clear to auscultation bilaterally, respirations unlabored, no wheezing or crackles  Heart: Regular rate and rhythm, no murmur   Breast:  Normal appearance.  No palpable masses, nipple discharge, or skin changes  Abdomen: Soft, non-tender, no masses, no organomegaly  Extremities: Extremities with strong and symmetric pulses, no cyanosis or edema  Skin: Skin color, texture normal, no rashes or lesions  Neurologic: Grossly normal, no focal deficits  Pelvic exam: mild atrophic changes of external genitalia, normal-appearing cervix, mild yellow discharge

## 2023-08-16 ENCOUNTER — OFFICE VISIT (OUTPATIENT)
Dept: FAMILY MEDICINE | Facility: CLINIC | Age: 63
End: 2023-08-16
Payer: COMMERCIAL

## 2023-08-16 VITALS
WEIGHT: 148.6 LBS | TEMPERATURE: 97.4 F | RESPIRATION RATE: 18 BRPM | SYSTOLIC BLOOD PRESSURE: 120 MMHG | HEIGHT: 64 IN | DIASTOLIC BLOOD PRESSURE: 70 MMHG | BODY MASS INDEX: 25.37 KG/M2 | HEART RATE: 64 BPM | OXYGEN SATURATION: 97 %

## 2023-08-16 DIAGNOSIS — Z13.1 DIABETES MELLITUS SCREENING: ICD-10-CM

## 2023-08-16 DIAGNOSIS — M54.41 ACUTE BILATERAL LOW BACK PAIN WITH BILATERAL SCIATICA: ICD-10-CM

## 2023-08-16 DIAGNOSIS — Z12.4 CERVICAL CANCER SCREENING: ICD-10-CM

## 2023-08-16 DIAGNOSIS — R87.810 ASCUS WITH POSITIVE HIGH RISK HPV CERVICAL: ICD-10-CM

## 2023-08-16 DIAGNOSIS — M54.42 ACUTE BILATERAL LOW BACK PAIN WITH BILATERAL SCIATICA: ICD-10-CM

## 2023-08-16 DIAGNOSIS — Z00.00 ROUTINE ADULT HEALTH MAINTENANCE: Primary | ICD-10-CM

## 2023-08-16 DIAGNOSIS — E78.2 MIXED HYPERLIPIDEMIA: ICD-10-CM

## 2023-08-16 DIAGNOSIS — Z12.31 VISIT FOR SCREENING MAMMOGRAM: ICD-10-CM

## 2023-08-16 DIAGNOSIS — R87.610 ASCUS WITH POSITIVE HIGH RISK HPV CERVICAL: ICD-10-CM

## 2023-08-16 LAB
ANION GAP SERPL CALCULATED.3IONS-SCNC: 10 MMOL/L (ref 7–15)
BUN SERPL-MCNC: 19.3 MG/DL (ref 8–23)
CALCIUM SERPL-MCNC: 9.7 MG/DL (ref 8.8–10.2)
CHLORIDE SERPL-SCNC: 105 MMOL/L (ref 98–107)
CHOLEST SERPL-MCNC: 190 MG/DL
CREAT SERPL-MCNC: 0.66 MG/DL (ref 0.51–0.95)
DEPRECATED HCO3 PLAS-SCNC: 25 MMOL/L (ref 22–29)
ERYTHROCYTE [DISTWIDTH] IN BLOOD BY AUTOMATED COUNT: 13.8 % (ref 10–15)
GFR SERPL CREATININE-BSD FRML MDRD: >90 ML/MIN/1.73M2
GLUCOSE SERPL-MCNC: 86 MG/DL (ref 70–99)
HBA1C MFR BLD: 5.5 % (ref 0–5.6)
HCT VFR BLD AUTO: 36.1 % (ref 35–47)
HDLC SERPL-MCNC: 85 MG/DL
HGB BLD-MCNC: 12.5 G/DL (ref 11.7–15.7)
LDLC SERPL CALC-MCNC: 87 MG/DL
MCH RBC QN AUTO: 30.1 PG (ref 26.5–33)
MCHC RBC AUTO-ENTMCNC: 34.6 G/DL (ref 31.5–36.5)
MCV RBC AUTO: 87 FL (ref 78–100)
NONHDLC SERPL-MCNC: 105 MG/DL
PLATELET # BLD AUTO: 176 10E3/UL (ref 150–450)
POTASSIUM SERPL-SCNC: 4.6 MMOL/L (ref 3.4–5.3)
RBC # BLD AUTO: 4.15 10E6/UL (ref 3.8–5.2)
SODIUM SERPL-SCNC: 140 MMOL/L (ref 136–145)
TRIGL SERPL-MCNC: 88 MG/DL
WBC # BLD AUTO: 7.1 10E3/UL (ref 4–11)

## 2023-08-16 PROCEDURE — 36415 COLL VENOUS BLD VENIPUNCTURE: CPT | Performed by: FAMILY MEDICINE

## 2023-08-16 PROCEDURE — 83036 HEMOGLOBIN GLYCOSYLATED A1C: CPT | Performed by: FAMILY MEDICINE

## 2023-08-16 PROCEDURE — 87624 HPV HI-RISK TYP POOLED RSLT: CPT | Performed by: FAMILY MEDICINE

## 2023-08-16 PROCEDURE — G0145 SCR C/V CYTO,THINLAYER,RESCR: HCPCS | Performed by: FAMILY MEDICINE

## 2023-08-16 PROCEDURE — 99213 OFFICE O/P EST LOW 20 MIN: CPT | Mod: 25 | Performed by: FAMILY MEDICINE

## 2023-08-16 PROCEDURE — 99396 PREV VISIT EST AGE 40-64: CPT | Performed by: FAMILY MEDICINE

## 2023-08-16 PROCEDURE — 85027 COMPLETE CBC AUTOMATED: CPT | Performed by: FAMILY MEDICINE

## 2023-08-16 PROCEDURE — 80061 LIPID PANEL: CPT | Performed by: FAMILY MEDICINE

## 2023-08-16 PROCEDURE — 80048 BASIC METABOLIC PNL TOTAL CA: CPT | Performed by: FAMILY MEDICINE

## 2023-08-16 RX ORDER — MULTIVITAMIN WITH IRON
1 TABLET ORAL DAILY
COMMUNITY

## 2023-08-16 ASSESSMENT — PAIN SCALES - GENERAL: PAINLEVEL: MILD PAIN (2)

## 2023-09-27 ENCOUNTER — HOSPITAL ENCOUNTER (OUTPATIENT)
Dept: MRI IMAGING | Facility: CLINIC | Age: 63
Discharge: HOME | End: 2023-09-27
Attending: FAMILY MEDICINE
Payer: COMMERCIAL

## 2023-09-27 ENCOUNTER — HOSPITAL ENCOUNTER (OUTPATIENT)
Dept: MAMMOGRAPHY | Facility: CLINIC | Age: 63
Discharge: HOME | End: 2023-09-27
Attending: FAMILY MEDICINE
Payer: COMMERCIAL

## 2023-09-27 DIAGNOSIS — M54.41 ACUTE BILATERAL LOW BACK PAIN WITH BILATERAL SCIATICA: ICD-10-CM

## 2023-09-27 DIAGNOSIS — M54.42 ACUTE BILATERAL LOW BACK PAIN WITH BILATERAL SCIATICA: ICD-10-CM

## 2023-09-27 DIAGNOSIS — Z12.31 VISIT FOR SCREENING MAMMOGRAM: ICD-10-CM

## 2023-09-27 PROCEDURE — 72148 MRI LUMBAR SPINE W/O DYE: CPT

## 2023-09-27 PROCEDURE — 77067 SCR MAMMO BI INCL CAD: CPT

## 2023-10-03 DIAGNOSIS — M47.816 FACET ARTHRITIS OF LUMBAR REGION: ICD-10-CM

## 2023-10-03 DIAGNOSIS — M48.061 SPINAL STENOSIS OF LUMBAR REGION WITHOUT NEUROGENIC CLAUDICATION: Primary | ICD-10-CM

## 2023-11-08 ENCOUNTER — OFFICE VISIT (OUTPATIENT)
Dept: PHYSICAL MEDICINE AND REHAB | Facility: CLINIC | Age: 63
End: 2023-11-08
Attending: FAMILY MEDICINE
Payer: COMMERCIAL

## 2023-11-08 ENCOUNTER — HOSPITAL ENCOUNTER (OUTPATIENT)
Dept: GENERAL RADIOLOGY | Facility: HOSPITAL | Age: 63
Discharge: HOME | End: 2023-11-08
Attending: PHYSICAL MEDICINE & REHABILITATION | Admitting: PHYSICAL MEDICINE & REHABILITATION
Payer: COMMERCIAL

## 2023-11-08 VITALS
WEIGHT: 148 LBS | BODY MASS INDEX: 25.4 KG/M2 | SYSTOLIC BLOOD PRESSURE: 119 MMHG | HEART RATE: 69 BPM | DIASTOLIC BLOOD PRESSURE: 73 MMHG

## 2023-11-08 DIAGNOSIS — M48.062 SPINAL STENOSIS OF LUMBAR REGION WITH NEUROGENIC CLAUDICATION: Primary | ICD-10-CM

## 2023-11-08 DIAGNOSIS — M47.816 LUMBAR FACET ARTHROPATHY: ICD-10-CM

## 2023-11-08 DIAGNOSIS — R29.898 LEFT LEG WEAKNESS: ICD-10-CM

## 2023-11-08 DIAGNOSIS — M43.16 SPONDYLOLISTHESIS OF LUMBAR REGION: ICD-10-CM

## 2023-11-08 PROCEDURE — 72120 X-RAY BEND ONLY L-S SPINE: CPT

## 2023-11-08 PROCEDURE — 99204 OFFICE O/P NEW MOD 45 MIN: CPT | Performed by: PHYSICAL MEDICINE & REHABILITATION

## 2023-11-08 RX ORDER — GABAPENTIN 100 MG/1
100 CAPSULE ORAL 3 TIMES DAILY
Qty: 90 CAPSULE | Refills: 1 | Status: SHIPPED | OUTPATIENT
Start: 2023-11-08 | End: 2023-12-21

## 2023-11-08 RX ORDER — NABUMETONE 500 MG/1
500-1000 TABLET, FILM COATED ORAL 2 TIMES DAILY PRN
Qty: 90 TABLET | Refills: 1 | Status: SHIPPED | OUTPATIENT
Start: 2023-11-08 | End: 2024-01-04

## 2023-11-08 ASSESSMENT — PAIN SCALES - GENERAL: PAINLEVEL: MILD PAIN (2)

## 2023-11-08 NOTE — LETTER
11/8/2023         RE: Elidia Monterroso  [address] [facility] [address]        Dear Colleague,    Thank you for referring your patient, Elidia Monterroso, to the [facility]. Please see a copy of my visit note below.    Assessment/Plan:      Elidia was seen today for back pain.    Diagnoses and all orders for this visit:    Spinal stenosis of lumbar region with neurogenic claudication  -     Physical Therapy Referral; Future  -     gabapentin (NEURONTIN) 100 MG capsule; Take 1 capsule (100 mg) by mouth 3 times daily  -     nabumetone (RELAFEN) 500 MG tablet; Take 1-2 tablets (500-1,000 mg) by mouth 2 times daily as needed for moderate pain  -     Pain Transforaminal Veena Inj Lmbscrl 1 Lvl Delio; Future    Spondylolisthesis of lumbar region  -     Physical Therapy Referral; Future  -     XR Lumbar Bending Only 2/3 Views; Future    Lumbar facet arthropathy  -     Physical Therapy Referral; Future  -     nabumetone (RELAFEN) 500 MG tablet; Take 1-2 tablets (500-1,000 mg) by mouth 2 times daily as needed for moderate pain    Left leg weakness  -     Physical Therapy Referral; Future    Other orders  -     Spine  Referral         Assessment: Pleasant 62 year old female otherwise healthy with:    1.  Chronic lumbar spine pain with left greater than right lower extremity radicular pain neurogenic claudication.  She has severe spinal stenosis at L4-5.  Degenerative severe facet arthritis and spondylolisthesis with lateral recess stenosis and moderate to severe L4-5 central stenosis with severe facet arthropathy and synovial cyst posteriorly.  She has some associated left great toe extensor weakness which is very mild.  Pain is worsening despite physical therapy 1 year ago doing home exercises regularly but only attended 1 visit due to cost.  On ibuprofen regularly.    2.  Left knee Baker's cyst.  Reassurance provided given no ongoing pain.    Discussion:    1.  I discussed  the diagnosis and treatment options with the patient.  We discussed she has been through some physical therapy may need to continue with a few more visits.  We also discussed medication options further imaging surgical evaluation and further diagnostics.  She wants to avoid surgery if possible.  Wants to decrease medications overall if possible with other treatment.    2.  Flexion-extension x-rays to evaluate for instability given the severity of the facet arthritis.    3.  Trial nabumetone 500-1000 mg twice daily in place of ibuprofen to see if this would be more effective given the amount of ibuprofen she is taking.    4.  Trial gabapentin 100 mg at bedtime slowly increasing to 100 mg 3 times daily.    5.  We will start physical therapy again for pelvic tilt core strengthening and nerve glides.    6.  Reassurance regarding left great toe extensor weakness will monitor as this is very mild.    7.  Recommend bilateral L4-5 TFESI to treat the lateral recess stenosis at L3-4.  Will be at [facility].    8.  Follow-up with me after her injection.      It was our pleasure caring for your patient today, if there any questions or concerns please do not hesitate to contact us.      Subjective:   Patient ID: Elidia Monterroso is a 62 year old female.    History of Present Illness: Patient presents at the request of  for evaluation of low back pain with bilateral lower extremity pain gluteal pain and paresthesias into the legs.  This is been present for at least a year without any injury.  Left greater than right lower lumbar spine and buttock down the posterior leg to the calves.  Constant pain in the buttock with intermittent numbness tingling and weakness down the legs.  Worse with walking better with lumbar flexion or sitting.  Ibuprofen regularly 6 to 12/day.  Was seen in November of last year for physical therapy had 1 visit due to cost but is doing home exercises regularly.  No chiropractic.  Had an MRI of  the lumbar spine.  Her pain is a 10/10 at worst 2/10 today 0/10 at best.    She also has a bulge behind her left knee which she inquires about today.  No knee pain.    Imaging: MRI report and images were personally reviewed and discussed with the patient.  A plastic model was utilized during the discussion.  MRI of the Lumbar spine personally reviewed revealing mild facet arthropathy L5-S1 normal disc and no significant foraminal stenosis but there is mild right foraminal stenosis.  L4-5 broad-based disc bulge moderate facet arthropathy with facet joint fluid and synovial cyst on the left.  This results in moderate central stenosis and lateral recess stenosis bilaterally.  Moderate right foraminal stenosis.  At L3-4 uncovering of the disc mild reactive left facet joint synovitis severe spinal stenosis moderate right moderate severe left foraminal stenosis.  L2-3 broad-based as well as moderate facet arthropathy with moderate central stenosis.Grade 1 spondylolisthesis of L3 on L4 appears degenerative.    CT abdomen pelvis from 2009 reviewed showing 5 lumbar vertebrae with transitional L5 segment with pseudoarticulation on the right sacral wing with a transverse process of L5.         Review of Systems: Complains of joint pain muscle pain muscle fatigue.  Denies fever, weight loss, waking, headache, change in vision, chest pain, shortness of breath, abdominal pain, nausea vomiting, bowel or bladder incontinence, skin issues, balance issues.  Remainder of 12 point review systems negative unless listed above.    Past Medical History:   Diagnosis Date     Bilateral hearing loss 1/17/2020    Hearing aids 2018      Mixed hyperlipidemia 1/17/2020     Restless legs syndrome (RLS) 1/17/2020       Family History   Problem Relation Age of Onset     Diabetes Mother      Parkinsonism Mother      Other - See Comments Mother         shingles     Valvular heart disease Mother         s/p surg     Arrhythmia Mother      Bone Cancer  "Father 53.00     Lymphoma Maternal Grandmother 70.00     Other - See Comments Maternal Grandfather 98.00        shingles     Stomach Cancer Paternal Grandmother 77.00     Cancer Paternal Grandfather 65.00     Cerebrovascular Disease Paternal Uncle 80.00     Coronary Artery Disease Paternal Aunt 85.00     No Known Problems Daughter          Social History     Socioeconomic History     Marital status: Single     Spouse name: None     Number of children: None     Years of education: None     Highest education level: None   Tobacco Use     Smoking status: Former     Packs/day: 1.00     Years: 30.00     Additional pack years: 0.00     Total pack years: 30.00     Types: Cigarettes     Quit date: 2002     Years since quittin.8     Smokeless tobacco: Never   Substance and Sexual Activity     Alcohol use: Yes     Drug use: Not Currently     Sexual activity: Yes     Partners: Male     Birth control/protection: Pill     Social history: Single.  Works in retail at [employer].  No tobacco.  Does drink alcohol \"socially \".    The following portions of the patient's history were reviewed and updated as appropriate: allergies, current medications, past family history, past medical history, past social history, past surgical history and problem list.    Oswestry (KEVEN) Questionnaire        2023     7:56 AM   OSWESTRY DISABILITY INDEX   Count 8   Sum 6   Oswestry Score (%) 15 %       Neck Disability Index:      2023     7:58 AM   Neck Disability Index (  Dewayne H. and Jamel C. 1991. All rights reserved.; used with permission)   SECTION 1 - PAIN INTENSITY 0   SECTION 2 - PERSONAL CARE 0   SECTION 3 - LIFTING 0   SECTION 4 - READING 0   SECTION 5 - HEADACHES 0   SECTION 7 - WORK 0   SECTION 8 - DRIVING 0   SECTION 9 - SLEEPING 1   Count 8   Sum 1   Raw Score: /50 1.25   Neck Disability Index Score: (%) 2.5 %          PHQ-2 Score:         2023     2:06 PM 2023     2:03 PM   PHQ-2 (  [employer]) "   Q1: Little interest or pleasure in doing things 0 0   Q2: Feeling down, depressed or hopeless 0 0   PHQ-2 Score 0 0   Q1: Little interest or pleasure in doing things  Not at all   Q2: Feeling down, depressed or hopeless  Not at all   PHQ-2 Score  0                  Objective:   Physical Exam:    /73   Pulse 69   Wt 148 lb (67.1 kg)   BMI 25.40 kg/m    Body mass index is 25.4 kg/m .    General:  Well-appearing female in no acute distress.  Pleasant, cooperative, and interactive throughout the examination and interview.  CV: No lower extremity edema on inspection or paltation.  Lymphatics: No cervical lymphadenopathy palpated. Eyes: sclera clear. Skin: No rashes or lesions seen over the head/neck, hairline, arms, legs, trunk.  Respirations unlabored.  MSK: Gait is nonantalgic.  Able to heel-toe walk without difficulty.  Negative Romberg.  Spine: normal AP curves of the C, T, and L spine.  Full range of motion in the Lumbar spine in flexion, reduced extension to neutral with pain.  Palpation: Tenderness to palpation over bilateral gluteal tissues mildly.  Extremities: Full range of motion of the elbows, and wrists with no effusions or tenderness to palpation.   Full range of motion of the hips, knees, and ankles with no effusions or tenderness to palpation.  Baker's cyst left knee.  Nontender.  No hypermobility of the upper or lower extremities.  Neurologic exam: Mental status: Patient is alert and oriented with normal affect.  Attention, knowledge, memory, and language are intact.  Normal coordination throughout the examination.  Reflexes are 2+ and symmetric biceps, triceps, brachioradialis, patellar, and trace bilateral Achilles with down-going toes and Negative Meri's.  Sensation is intact to light touch throughout the upper and lower extremities bilaterally.  Manual muscle testing reveals 5 out of 5 in the hip flexors, knee flexors/extensors, ankle plantar flexors, ankle  dorsiflexors, and 5 -/5  left 5/5 right EHL.  Upper extremities: Grossly normal strength . Normal muscle bulk and tone in the arms and legs.    Negative seated  straight leg raise bilaterally.            Again, thank you for allowing me to participate in the care of your patient.        Sincerely,        Ulysses Perez, DO

## 2023-11-08 NOTE — PROGRESS NOTES
Assessment/Plan:      Elidia was seen today for back pain.    Diagnoses and all orders for this visit:    Spinal stenosis of lumbar region with neurogenic claudication  -     Physical Therapy Referral; Future  -     gabapentin (NEURONTIN) 100 MG capsule; Take 1 capsule (100 mg) by mouth 3 times daily  -     nabumetone (RELAFEN) 500 MG tablet; Take 1-2 tablets (500-1,000 mg) by mouth 2 times daily as needed for moderate pain  -     Pain Transforaminal Veena Inj Lmbscrl 1 Lvl Delio; Future    Spondylolisthesis of lumbar region  -     Physical Therapy Referral; Future  -     XR Lumbar Bending Only 2/3 Views; Future    Lumbar facet arthropathy  -     Physical Therapy Referral; Future  -     nabumetone (RELAFEN) 500 MG tablet; Take 1-2 tablets (500-1,000 mg) by mouth 2 times daily as needed for moderate pain    Left leg weakness  -     Physical Therapy Referral; Future    Other orders  -     Spine  Referral         Assessment: Pleasant 62 year old female otherwise healthy with:    1.  Chronic lumbar spine pain with left greater than right lower extremity radicular pain neurogenic claudication.  She has severe spinal stenosis at L4-5.  Degenerative severe facet arthritis and spondylolisthesis with lateral recess stenosis and moderate to severe L4-5 central stenosis with severe facet arthropathy and synovial cyst posteriorly.  She has some associated left great toe extensor weakness which is very mild.  Pain is worsening despite physical therapy 1 year ago doing home exercises regularly but only attended 1 visit due to cost.  On ibuprofen regularly.    2.  Left knee Baker's cyst.  Reassurance provided given no ongoing pain.    Discussion:    1.  I discussed the diagnosis and treatment options with the patient.  We discussed she has been through some physical therapy may need to continue with a few more visits.  We also discussed medication options further imaging surgical evaluation and further diagnostics.  She wants  to avoid surgery if possible.  Wants to decrease medications overall if possible with other treatment.    2.  Flexion-extension x-rays to evaluate for instability given the severity of the facet arthritis.    3.  Trial nabumetone 500-1000 mg twice daily in place of ibuprofen to see if this would be more effective given the amount of ibuprofen she is taking.    4.  Trial gabapentin 100 mg at bedtime slowly increasing to 100 mg 3 times daily.    5.  We will start physical therapy again for pelvic tilt core strengthening and nerve glides.    6.  Reassurance regarding left great toe extensor weakness will monitor as this is very mild.    7.  Recommend bilateral L4-5 TFESI to treat the lateral recess stenosis at L3-4.  Will be at [facility].    8.  Follow-up with me after her injection.      It was our pleasure caring for your patient today, if there any questions or concerns please do not hesitate to contact us.      Subjective:   Patient ID: Elidia Monterroso is a 62 year old female.    History of Present Illness: Patient presents at the request of  for evaluation of low back pain with bilateral lower extremity pain gluteal pain and paresthesias into the legs.  This is been present for at least a year without any injury.  Left greater than right lower lumbar spine and buttock down the posterior leg to the calves.  Constant pain in the buttock with intermittent numbness tingling and weakness down the legs.  Worse with walking better with lumbar flexion or sitting.  Ibuprofen regularly 6 to 12/day.  Was seen in November of last year for physical therapy had 1 visit due to cost but is doing home exercises regularly.  No chiropractic.  Had an MRI of the lumbar spine.  Her pain is a 10/10 at worst 2/10 today 0/10 at best.    She also has a bulge behind her left knee which she inquires about today.  No knee pain.    Imaging: MRI report and images were personally reviewed and discussed with the patient.  HUGO  plastic model was utilized during the discussion.  MRI of the Lumbar spine personally reviewed revealing mild facet arthropathy L5-S1 normal disc and no significant foraminal stenosis but there is mild right foraminal stenosis.  L4-5 broad-based disc bulge moderate facet arthropathy with facet joint fluid and synovial cyst on the left.  This results in moderate central stenosis and lateral recess stenosis bilaterally.  Moderate right foraminal stenosis.  At L3-4 uncovering of the disc mild reactive left facet joint synovitis severe spinal stenosis moderate right moderate severe left foraminal stenosis.  L2-3 broad-based as well as moderate facet arthropathy with moderate central stenosis.Grade 1 spondylolisthesis of L3 on L4 appears degenerative.    CT abdomen pelvis from 2009 reviewed showing 5 lumbar vertebrae with transitional L5 segment with pseudoarticulation on the right sacral wing with a transverse process of L5.         Review of Systems: Complains of joint pain muscle pain muscle fatigue.  Denies fever, weight loss, waking, headache, change in vision, chest pain, shortness of breath, abdominal pain, nausea vomiting, bowel or bladder incontinence, skin issues, balance issues.  Remainder of 12 point review systems negative unless listed above.    Past Medical History:   Diagnosis Date    Bilateral hearing loss 1/17/2020    Hearing aids 2018     Mixed hyperlipidemia 1/17/2020    Restless legs syndrome (RLS) 1/17/2020       Family History   Problem Relation Age of Onset    Diabetes Mother     Parkinsonism Mother     Other - See Comments Mother         shingles    Valvular heart disease Mother         s/p surg    Arrhythmia Mother     Bone Cancer Father 53.00    Lymphoma Maternal Grandmother 70.00    Other - See Comments Maternal Grandfather 98.00        shingles    Stomach Cancer Paternal Grandmother 77.00    Cancer Paternal Grandfather 65.00    Cerebrovascular Disease Paternal Uncle 80.00    Coronary Artery  "Disease Paternal Aunt 85.00    No Known Problems Daughter          Social History     Socioeconomic History    Marital status: Single     Spouse name: None    Number of children: None    Years of education: None    Highest education level: None   Tobacco Use    Smoking status: Former     Packs/day: 1.00     Years: 30.00     Additional pack years: 0.00     Total pack years: 30.00     Types: Cigarettes     Quit date: 2002     Years since quittin.8    Smokeless tobacco: Never   Substance and Sexual Activity    Alcohol use: Yes    Drug use: Not Currently    Sexual activity: Yes     Partners: Male     Birth control/protection: Pill     Social history: Single.  Works in retail at [employer].  No tobacco.  Does drink alcohol \"socially \".    The following portions of the patient's history were reviewed and updated as appropriate: allergies, current medications, past family history, past medical history, past social history, past surgical history and problem list.    Oswestry (KEVEN) Questionnaire        2023     7:56 AM   OSWESTRY DISABILITY INDEX   Count 8   Sum 6   Oswestry Score (%) 15 %       Neck Disability Index:      2023     7:58 AM   Neck Disability Index (  Dewayne H. and Jamel C. 1991. All rights reserved.; used with permission)   SECTION 1 - PAIN INTENSITY 0   SECTION 2 - PERSONAL CARE 0   SECTION 3 - LIFTING 0   SECTION 4 - READING 0   SECTION 5 - HEADACHES 0   SECTION 7 - WORK 0   SECTION 8 - DRIVING 0   SECTION 9 - SLEEPING 1   Count 8   Sum 1   Raw Score: /50 1.25   Neck Disability Index Score: (%) 2.5 %          PHQ-2 Score:         2023     2:06 PM 2023     2:03 PM   PHQ-2 (  [employer])   Q1: Little interest or pleasure in doing things 0 0   Q2: Feeling down, depressed or hopeless 0 0   PHQ-2 Score 0 0   Q1: Little interest or pleasure in doing things  Not at all   Q2: Feeling down, depressed or hopeless  Not at all   PHQ-2 Score  0                  Objective: "   Physical Exam:    /73   Pulse 69   Wt 148 lb (67.1 kg)   BMI 25.40 kg/m    Body mass index is 25.4 kg/m .    General:  Well-appearing female in no acute distress.  Pleasant, cooperative, and interactive throughout the examination and interview.  CV: No lower extremity edema on inspection or paltation.  Lymphatics: No cervical lymphadenopathy palpated. Eyes: sclera clear. Skin: No rashes or lesions seen over the head/neck, hairline, arms, legs, trunk.  Respirations unlabored.  MSK: Gait is nonantalgic.  Able to heel-toe walk without difficulty.  Negative Romberg.  Spine: normal AP curves of the C, T, and L spine.  Full range of motion in the Lumbar spine in flexion, reduced extension to neutral with pain.  Palpation: Tenderness to palpation over bilateral gluteal tissues mildly.  Extremities: Full range of motion of the elbows, and wrists with no effusions or tenderness to palpation.   Full range of motion of the hips, knees, and ankles with no effusions or tenderness to palpation.  Baker's cyst left knee.  Nontender.  No hypermobility of the upper or lower extremities.  Neurologic exam: Mental status: Patient is alert and oriented with normal affect.  Attention, knowledge, memory, and language are intact.  Normal coordination throughout the examination.  Reflexes are 2+ and symmetric biceps, triceps, brachioradialis, patellar, and trace bilateral Achilles with down-going toes and Negative Meri's.  Sensation is intact to light touch throughout the upper and lower extremities bilaterally.  Manual muscle testing reveals 5 out of 5 in the hip flexors, knee flexors/extensors, ankle plantar flexors, ankle  dorsiflexors, and 5 -/5 left 5/5 right EHL.  Upper extremities: Grossly normal strength . Normal muscle bulk and tone in the arms and legs.    Negative seated  straight leg raise bilaterally.

## 2023-11-08 NOTE — PATIENT INSTRUCTIONS
An xray was ordered for you today.  Please call Radiology at [phone].   A physical therapy order was provided for you today.  You will be contacted by physical therapy.  If nobody contacts you within 3 to 5 days, please contact the clinic at [phone].  It will be very important for you to do your physical therapy exercises on a regular basis to decrease your pain and prevent future pain flares.   A Lumbar injection has been ordered today. Please schedule this injection at least 2 weeks from now to allow time for insurance prior authorization. On the day of your injection, you cannot be sick or taking antibiotics. If you become sick and are prescribed, please call the clinic so your injection can be rescheduled for once you have completed your antibiotics. You will need to bring a  with you for your injection. If you have any questions or concerns prior to your injection, please do not hesitate to call the nurse navigation line at [phone].   Nabumetone (which is an anti-inflammatory) medication is prescribed today. Take 1-2 tablets 2 times a day as needed for pain. This medication should be taken with food and water to prevent any stomach upset. Do not take ibuprofen/Advil/Motrin/Aleve/naproxen while you take Nabumetone. Please call if you have any side effects.   Prescribed Gabapentin today, 100 mg tablets, to be titrated up to 1 tablets  times a day as tolerated for your nerve pain. Please follow Gabapentin dosing chart below.    Gabapentin 100mg Dosing Chart    DATE  MORNING AFTERNOON BEDTIME    Day 1 0 0 1    Day 2 0 0 1    Day 3 0 0 1    Day 4 1 0 1    Day 5 1 0 1    Day 6 1 0 1    Day 7 1 1 1    Day 8 1 1 1    Day 9 1 1 1    Day 10        Day 11       Day 12       Day 13       Day 14       Day 15       Day 16       Day 17       Day 18       Day 19       Day 20       Day 21       Day 22       Day 23       Day 24       Day 25       Day 26       Day 27        Continue medication, taking 1  capsule  three times daily    Please call if you have any questions regarding how to take your medication  Clinic Phone # [phone]

## 2023-11-09 ENCOUNTER — THERAPY VISIT (OUTPATIENT)
Dept: PHYSICAL THERAPY | Facility: REHABILITATION | Age: 63
End: 2023-11-09
Attending: PHYSICAL MEDICINE & REHABILITATION
Payer: COMMERCIAL

## 2023-11-09 DIAGNOSIS — M48.062 SPINAL STENOSIS OF LUMBAR REGION WITH NEUROGENIC CLAUDICATION: ICD-10-CM

## 2023-11-09 DIAGNOSIS — R29.898 LEFT LEG WEAKNESS: ICD-10-CM

## 2023-11-09 DIAGNOSIS — M47.816 LUMBAR FACET ARTHROPATHY: ICD-10-CM

## 2023-11-09 DIAGNOSIS — M43.16 SPONDYLOLISTHESIS OF LUMBAR REGION: ICD-10-CM

## 2023-11-09 PROCEDURE — 97161 PT EVAL LOW COMPLEX 20 MIN: CPT | Mod: GP | Performed by: PHYSICAL THERAPIST

## 2023-11-09 PROCEDURE — 97110 THERAPEUTIC EXERCISES: CPT | Mod: GP | Performed by: PHYSICAL THERAPIST

## 2023-11-09 NOTE — PROGRESS NOTES
PHYSICAL THERAPY EVALUATION  Type of Visit: Evaluation    See electronic medical record for Abuse and Falls Screening details.    Subjective       Presenting condition or subjective complaint: Back pain  Date of onset: 11/08/23 (date of order chronic since 7/2022 per patient)    Relevant medical history: Pain at night or rest   Dates & types of surgery:      Prior diagnostic imaging/testing results: MRI; X-ray     Prior therapy history for the same diagnosis, illness or injury: Yes 11/2022    Prior Level of Function  Transfers: Independent  Ambulation: Independent  ADL: Independent      Living Environment  Social support: Alone   Type of home: Multi-level   Stairs to enter the home: Yes 10 Is there a railing: Yes   Ramp: No   Stairs inside the home: Yes 10 Is there a railing: Yes   Help at home: None  Equipment owned:       Employment: Yes retail  Hobbies/Interests: biking, travel, walking    Patient goals for therapy: pain relief with activity    Pain assessment: See objective evaluation for additional pain details     Objective   LUMBAR SPINE EVALUATION  PAIN: Pain Level at Rest: 2/10  Pain Level with Use: 6/10  Pain Location: lumbar spine  Pain is Worst: 9/10  Pain is Exacerbated By: standing, walking  Pain is Relieved By: NSAIDs, otc medications, and rest  INTEGUMENTARY (edema, incisions):   POSTURE: Standing Posture: Rounded shoulders, Forward head  Sitting Posture: Rounded shoulders, Forward head  GAIT:   Weightbearing Status: WBAT  Assistive Device(s): None  Gait Deviations: Antalgic  Drea decreased    ROM:   (Degrees) Left AROM Left PROM  Right AROM Right PROM   Hip Flexion  Min-mod limitation  Min-mod limitation   Hip Extension  Mod imitation  Severe limitation   Hip Abduction       Hip Adduction       Hip Internal Rotation  severe  Severe    Hip External Rotation  mod  mod   Knee Flexion WNL   WNL   Knee Extension WNL   WNL   Lumbar Side glide severe Severe.   Lumbar Flexion min   Lumbar Extension  Mod-severe   Pain:   End feel:   PELVIC/SI SCREEN:   STRENGTH:  knee flexion 5/5 bilateral, hip abduction and adduction 4/5 bilateral    MYOTOMES:    Left Right   T12-L3 (Hip Flexion) 4 4   L2-4 (Quads)  5 5   L4 (Ankle DF) 5 5   L5 (Great Toe Ext)     S1 (Toe Raise)       DTR S:   CORD SIGNS:   DERMATOMES:   NEURAL TENSION:   FLEXIBILITY: Decreased piriformis L, Decreased hip flexors L, Decreased quadriceps L, Decreased hamstrings L, Decreased piriformis R, Decreased hip flexors R, Decreased quadriceps R, Decreased hamstrings R  LUMBAR/HIP Special Tests:    Left Right   SAMIR Negative  Negative    FADIR/Labrum/NIMO Negative  Negative    Femoral Nerve     Jeremiah's     Piriformis     Quadrant Testing     SLR Negative  Negative    Slump Negative  Negative    Stork with Extension     Aquiles             PELVIS/SI SPECIAL TESTS:   FUNCTIONAL TESTS:   PALPATION:   SPINAL SEGMENTAL CONCLUSIONS:       Assessment & Plan   CLINICAL IMPRESSIONS  Medical Diagnosis: Spinal stenosis of lumbar region with neurogenic claudication; Spondylolisthesis of lumbar region; Left leg weakness; Lumbar facet arthropathy    Treatment Diagnosis: Spinal stenosis of lumbar region with neurogenic claudication; Spondylolisthesis of lumbar region; Left leg weakness; Lumbar facet arthropathy   Impression/Assessment: Patient is a 62 year old female with back  complaints.  The following significant findings have been identified: Pain, Decreased ROM/flexibility, Decreased joint mobility, Decreased strength, Impaired muscle performance, and Decreased activity tolerance. These impairments interfere with their ability to perform self care tasks, work tasks, recreational activities, household chores, household mobility, and community mobility as compared to previous level of function.     Clinical Decision Making (Complexity):  Clinical Presentation: Stable/Uncomplicated  Clinical Presentation Rationale: based on medical and personal factors listed in PT  evaluation  Clinical Decision Making (Complexity): Low complexity    PLAN OF CARE  Treatment Interventions:  Interventions: Manual Therapy, Neuromuscular Re-education, Therapeutic Activity, Therapeutic Exercise, Self-Care/Home Management    Long Term Goals     PT Goal 1  Goal Identifier: HEP  Goal Description: Patient will be independent with HEP and self management of symptoms  Rationale: to maximize safety and independence with performance of ADLs and functional tasks  Goal Progress: initial  Target Date: 01/08/24  PT Goal 2  Goal Identifier: walking  Goal Description: Patient will walk community distances without leg pain or tingling for one week  Rationale: to maximize safety and independence within the community  Goal Progress: initial  Target Date: 01/08/24  PT Goal 3  Goal Identifier: sleeping  Goal Description: Patient will sleep without interruption from pain for one week  Rationale: to maximize safety and independence with performance of ADLs and functional tasks  Goal Progress: initial  Target Date: 01/08/24      Frequency of Treatment: 1x/week  Duration of Treatment: 60 days    Recommended Referrals to Other Professionals:   not at this time.  Education Assessment:   Learner/Method: Patient;No Barriers to Learning    Risks and benefits of evaluation/treatment have been explained.   Patient/Family/caregiver agrees with Plan of Care.     Evaluation Time:     PT Eval, Low Complexity Minutes (34017): 20       Signing Clinician: Haider Carr, PT

## 2023-11-29 ENCOUNTER — RADIOLOGY INJECTION OFFICE VISIT (OUTPATIENT)
Dept: PHYSICAL MEDICINE AND REHAB | Facility: CLINIC | Age: 63
End: 2023-11-29
Attending: PHYSICAL MEDICINE & REHABILITATION
Payer: COMMERCIAL

## 2023-11-29 VITALS
HEART RATE: 63 BPM | OXYGEN SATURATION: 97 % | RESPIRATION RATE: 16 BRPM | TEMPERATURE: 97.2 F | SYSTOLIC BLOOD PRESSURE: 130 MMHG | DIASTOLIC BLOOD PRESSURE: 76 MMHG

## 2023-11-29 DIAGNOSIS — M48.062 SPINAL STENOSIS OF LUMBAR REGION WITH NEUROGENIC CLAUDICATION: ICD-10-CM

## 2023-11-29 PROCEDURE — 64483 NJX AA&/STRD TFRM EPI L/S 1: CPT | Mod: 50 | Performed by: PAIN MEDICINE

## 2023-11-29 RX ORDER — DEXAMETHASONE SODIUM PHOSPHATE 10 MG/ML
INJECTION, SOLUTION INTRAMUSCULAR; INTRAVENOUS
Status: COMPLETED | OUTPATIENT
Start: 2023-11-29 | End: 2023-11-29

## 2023-11-29 RX ORDER — LIDOCAINE HYDROCHLORIDE 10 MG/ML
INJECTION, SOLUTION EPIDURAL; INFILTRATION; INTRACAUDAL; PERINEURAL
Status: COMPLETED | OUTPATIENT
Start: 2023-11-29 | End: 2023-11-29

## 2023-11-29 RX ADMIN — LIDOCAINE HYDROCHLORIDE 4 ML: 10 INJECTION, SOLUTION EPIDURAL; INFILTRATION; INTRACAUDAL; PERINEURAL at 10:41

## 2023-11-29 RX ADMIN — DEXAMETHASONE SODIUM PHOSPHATE 10 MG: 10 INJECTION, SOLUTION INTRAMUSCULAR; INTRAVENOUS at 10:42

## 2023-11-29 ASSESSMENT — PAIN SCALES - GENERAL
PAINLEVEL: NO PAIN (0)
PAINLEVEL: MILD PAIN (2)

## 2023-11-29 NOTE — PATIENT INSTRUCTIONS
Follow-up visit with Dr. Perez in 2-4 weeks to discuss injection outcome and determine care plan going forward.       DISCHARGE INSTRUCTIONS    During office hours (8:00 a.m.- 4:00 p.m.) questions or concerns may be answered  by calling [facility] Navigation Nurses at  [phone].  Messages received after hours will be returned the following business day.      In the case of an emergency, please dial 911 or seek assistance at the nearest Emergency Room/Urgent Care facility.     All Patients:    You may experience an increase in your symptoms for the first 2 days (It may take anywhere between 2 days- 2 weeks for the steroid to have maximum effect).    You may use ice on the injection site, as frequently as 20 minutes each hour if needed.    You may take your pain medicine.    You may continue taking your regular medication after your injection. If you have had a Medial Branch Block you may resume pain medication once your pain diary is completed.    You may shower. No swimming, tub bath or hot tub for 48 hours.  You may remove your bandaid/bandage as soon as you are home.    You may resume light activities, as tolerated.    Resume your usual diet as tolerated.    It is strongly advised that you do not drive for 1-3 hours post injection.    If you have had oral sedation:  Do not drive for 8 hours post injection.      If you have had IV sedation:  Do not drive for 24 hours post injection.  Do not operate hazardous machinery or make important personal/business decisions for 24 hours.      POSSIBLE STEROID SIDE EFFECTS (If steroid/cortisone was used for your procedure)    -If you experience these symptoms, it should only last for a short period    Swelling of the legs              Skin redness (flushing)     Mouth (oral) irritation   Blood sugar (glucose) levels            Sweats                    Mood changes  Headache  Sleeplessness  Weakened immune system for up to 14 days, which could increase the risk of  yazmin the COVID-19 virus and/or experiencing more severe symptoms of the disease, if exposed.  Decreased effectiveness of the flu vaccine if given within 2 weeks of the steroid.         POSSIBLE PROCEDURE SIDE EFFECTS  -Call the Spine Center if you are concerned  Increased Pain           Increased numbness/tingling      Nausea/Vomiting          Bruising/bleeding at site      Redness or swelling                                              Difficulty walking      Weakness           Fever greater than 100.5    *In the event of a severe headache after an epidural steroid injection that is relieved by lying down, please call the [facility] to speak with a clinical staff member*

## 2023-12-13 ENCOUNTER — THERAPY VISIT (OUTPATIENT)
Dept: PHYSICAL THERAPY | Facility: REHABILITATION | Age: 63
End: 2023-12-13
Payer: COMMERCIAL

## 2023-12-13 DIAGNOSIS — R29.898 LEFT LEG WEAKNESS: ICD-10-CM

## 2023-12-13 DIAGNOSIS — M47.816 LUMBAR FACET ARTHROPATHY: ICD-10-CM

## 2023-12-13 DIAGNOSIS — M48.062 SPINAL STENOSIS OF LUMBAR REGION WITH NEUROGENIC CLAUDICATION: Primary | ICD-10-CM

## 2023-12-13 DIAGNOSIS — M43.16 SPONDYLOLISTHESIS OF LUMBAR REGION: ICD-10-CM

## 2023-12-13 PROCEDURE — 97110 THERAPEUTIC EXERCISES: CPT | Mod: GP

## 2023-12-20 ENCOUNTER — THERAPY VISIT (OUTPATIENT)
Dept: PHYSICAL THERAPY | Facility: REHABILITATION | Age: 63
End: 2023-12-20
Payer: COMMERCIAL

## 2023-12-20 DIAGNOSIS — M48.062 SPINAL STENOSIS OF LUMBAR REGION WITH NEUROGENIC CLAUDICATION: Primary | ICD-10-CM

## 2023-12-20 DIAGNOSIS — M43.16 SPONDYLOLISTHESIS OF LUMBAR REGION: ICD-10-CM

## 2023-12-20 DIAGNOSIS — R29.898 LEFT LEG WEAKNESS: ICD-10-CM

## 2023-12-20 DIAGNOSIS — M47.816 LUMBAR FACET ARTHROPATHY: ICD-10-CM

## 2023-12-20 PROCEDURE — 97140 MANUAL THERAPY 1/> REGIONS: CPT | Mod: GP

## 2023-12-20 PROCEDURE — 97110 THERAPEUTIC EXERCISES: CPT | Mod: GP

## 2023-12-20 NOTE — PROGRESS NOTES
DISCHARGE  Reason for Discharge: Patient has met all goals.  Patient chooses to discontinue therapy.    Equipment Issued: NA    Discharge Plan: Patient to continue home program.    Referring Provider:  Ulysses Perez           12/20/23 0500   Appointment Info   Signing clinician's name / credentials Farhan Durán, PT   Visits Used 3   Medical Diagnosis Spinal stenosis of lumbar region with neurogenic claudication; Spondylolisthesis of lumbar region; Left leg weakness; Lumbar facet arthropathy   PT Tx Diagnosis Spinal stenosis of lumbar region with neurogenic claudication; Spondylolisthesis of lumbar region; Left leg weakness; Lumbar facet arthropathy   Progress Note/Certification   Onset of illness/injury or Date of Surgery 11/08/23  (date of order chronic since 7/2022 per patient)   Therapy Frequency 1x/week   Predicted Duration 60 days   Progress Note Due Date 12/09/23   Progress Note Completed Date 11/09/23   PT Goal 1   Goal Identifier HEP   Goal Description Patient will be independent with HEP and self management of symptoms   Rationale to maximize safety and independence with performance of ADLs and functional tasks   Goal Progress Independent   Target Date 01/08/24   Date Met 12/20/23   PT Goal 2   Goal Identifier walking   Goal Description Patient will walk community distances without leg pain or tingling for one week   Rationale to maximize safety and independence within the community   Goal Progress Met; no leg symptoms for several weeks now   Target Date 01/08/24   Date Met 12/20/23   PT Goal 3   Goal Identifier sleeping   Goal Description Patient will sleep without interruption from pain for one week   Rationale to maximize safety and independence with performance of ADLs and functional tasks   Goal Progress Met   Target Date 01/08/24   Date Met 12/20/23   Subjective Report   Subjective Report Elidia said that she was doing some lifting, bending, and carrying at work and home recently which irritated  her back. She describes the new pain as sharp, whereas before her pain was more achy. However, she has been taking ibuprofen and icing it, and it feels much better, though it still bothers her a bit. She reports wanting to discharge from physical therapy despite her new symptoms.   Objective Measure 1   Objective Measure Gait   Details Guarded; improved following manual therapy   Objective Measure 2   Objective Measure Worst Pain   Details Since last visit: 3-4/10   Objective Measure 3   Objective Measure Palpation   Details Tender to palpation at left lower lumbar paraspinals and proximal hip musculature   Therapeutic Procedure/Exercise   Therapeutic Procedures: strength, endurance, ROM, flexibillity minutes (91698) 24   Ther Proc 1 Nustep + subjective report + discussion regarding plan of care: 7 minutes   Ther Proc 2 Discharge planning and discussion regarding plan of care   Ther Proc 3 Objective measures: see objective measures   Ther Proc 5 Cat/cow: 1 x 6-7 (irritated low back so was stopped)   Ther Proc 6 - Details DKTC on exercise ball: 2 minutes   Ther Proc 7 - Details QL stretch: trialed but painful   Ther Proc 9 Seated piriformis stretch: 2 x 30 seconds bilateral   Skilled Intervention Review of [facility]   Patient Response/Progress Elidia reported notably improved symptoms following the seated piriformis stretch.   Manual Therapy   Manual Therapy: Mobilization, MFR, MLD, friction massage minutes (07185) 8   Manual Therapy 1 Soft tissue mobilization (STM)   Manual Therapy 1 - Details STM with moderate pressure to left lower lumbar paraspinals and proximal hip musculature in prone   Skilled Intervention STM to decrease pain and muscle tension   Patient Response/Progress Elidia reported notably improved symptoms following intervention, and her gait was less guarded as well.   Education   Learner/Method Patient;No Barriers to Learning   Plan   Home program See above   Updates to plan of care Discharge to [facility].    Comments   Comments Assessment: Elidia presents to physical therapy reporting new/increased left low back pain since last visit that she thinks is due to a day full of bending, lifting, carrying activities at home and work. With consistent icing and use of ibuprofen since then, she reports notable improvement, though it has not resolved completely. Her gait was guarded, and she was tender to palpation at the left lower lumbar paraspinals and proximal hip musculature on brief examination. She reported notably symptom relief following performance of a seated piriformis stretch and after manual therapy, and she demonstrated less guarded gait following manual therapy as well. Despite new symptoms, she still verbalizes desire to discharge from physical therapy at this time. She is now able to sleep through the night without waking due to low back pain, and she is also now able to walk community distances without any increased leg symptoms. She is independent with her home exercises and now wishes to be discharge to a home exercise/maintenance program.

## 2023-12-21 ENCOUNTER — OFFICE VISIT (OUTPATIENT)
Dept: PHYSICAL MEDICINE AND REHAB | Facility: CLINIC | Age: 63
End: 2023-12-21
Payer: COMMERCIAL

## 2023-12-21 VITALS — OXYGEN SATURATION: 98 % | SYSTOLIC BLOOD PRESSURE: 102 MMHG | HEART RATE: 86 BPM | DIASTOLIC BLOOD PRESSURE: 68 MMHG

## 2023-12-21 DIAGNOSIS — M48.062 SPINAL STENOSIS OF LUMBAR REGION WITH NEUROGENIC CLAUDICATION: Primary | ICD-10-CM

## 2023-12-21 DIAGNOSIS — R29.898 LEFT LEG WEAKNESS: ICD-10-CM

## 2023-12-21 DIAGNOSIS — M47.816 LUMBAR FACET ARTHROPATHY: ICD-10-CM

## 2023-12-21 DIAGNOSIS — M43.16 SPONDYLOLISTHESIS OF LUMBAR REGION: ICD-10-CM

## 2023-12-21 PROCEDURE — 99214 OFFICE O/P EST MOD 30 MIN: CPT | Performed by: PHYSICAL MEDICINE & REHABILITATION

## 2023-12-21 RX ORDER — GABAPENTIN 100 MG/1
200 CAPSULE ORAL 3 TIMES DAILY
Qty: 270 CAPSULE | Refills: 1 | Status: SHIPPED | OUTPATIENT
Start: 2023-12-21 | End: 2024-04-10

## 2023-12-21 ASSESSMENT — PAIN SCALES - GENERAL: PAINLEVEL: MILD PAIN (2)

## 2023-12-21 NOTE — PATIENT INSTRUCTIONS
See Neurosurgery to discuss option  Continue with Nabumetone as needed.      Prescribed Gabapentin today, 100 mg tablets, to be titrated up to 3 tablets 3 times a day as tolerated for your nerve pain. Please follow Gabapentin dosing chart below.    Gabapentin 100mg Dosing Chart    DATE  MORNING AFTERNOON BEDTIME    Day 1 0 0 1    Day 2 0 0 1    Day 3 0 0 1    Day 4 1 0 1    Day 5 1 0 1    Day 6 1 0 1    Day 7 1 1 1    Day 8 1 1 1    Day 9 1 1 1    Day 10 1 1 2    Day 11 1 1 2    Day 12 1 1 2    Day 13 2 1 2    Day 14 2 1 2    Day 15 2 1 2    Day 16 2 2 2    Day 17 2 2 2    Day 18 2 2 2    Day 19 2 2 3    Day 20 2 2 3    Day 21 2 2 3    Day 22 3 2 3    Day 23 3 2 3    Day 24 3 2 3    Day 25 3 3 3    Day 26 3 3 3    Day 27 3 3 3     Continue medication, taking 3 capsules three times daily    Please call if you have any questions regarding how to take your medication  Clinic Phone # [phone]

## 2023-12-21 NOTE — LETTER
12/21/2023         RE: Elidia Monterroso  [address] [address]        Dear Colleague,    Thank you for referring your patient, Elidia Monterroso, to the [facility]. Please see a copy of my visit note below.    Assessment/Plan:      Elidia was seen today for back pain.    Diagnoses and all orders for this visit:    Spinal stenosis of lumbar region with neurogenic claudication  -     Neurosurgery Referral; Future  -     gabapentin (NEURONTIN) 100 MG capsule; Take 2 capsules (200 mg) by mouth 3 times daily    Spondylolisthesis of lumbar region  -     Neurosurgery Referral; Future    Lumbar facet arthropathy    Left leg weakness         Assessment: Pleasant 63 year old female  otherwise healthy with:     1.  Chronic lumbar spine pain with left greater than right lower extremity radicular pain neurogenic claudication.  She has severe spinal stenosis at L 3-4.  Degenerative severe facet arthritis and spondylolisthesis with lateral recess stenosis and moderate to severe L 3-4 central stenosis with severe facet arthropathy and synovial cyst posteriorly.  She has some associated left great toe extensor weakness which is very mild.  Pain is worsening despite physical therapy 1 year ago doing home exercises regularly but only attended 1 visit due to cost.  Was up to 75% improved following bilateral L4-5 TFESI and then increased pain with some lifting this week and has had a pain flare but that is slowly improving.  Continues to take nabumetone 1000 mg twice a day and gabapentin 100 mg 3 times a day even after the injection.    2.  Left knee Baker's cyst.  Reassurance provided given no ongoing pain.      Discussion:    1.  We discussed the diagnosis and treatment options.  I reviewed the MRI with the patient again today.  She has severe spinal stenosis at L3-4 with spondylolisthesis and continues to take medications gabapentin 3 times daily and nabumetone twice a day despite  improvement with the injection she still has severe pain enough to take meds.  She also had a flare of her pain after lifting injury this week mostly low back and buttocks bilaterally.  That pain is slowly improving but still has the claudication symptoms.  We discussed surgical evaluation medication changes monitoring symptoms.    2.   Recommend increasing gabapentin slowly to 300 mg 3 times daily.    3.  Recommend neurosurgical evaluation to discuss surgical options.    4.  Continue nabumetone 500-1000 mg twice daily as needed.  Did discuss medication safety again today given  that she used ibuprofen with nabumetone on a couple of occasions.  Can take this with Tylenol.    5.  Can consider further injections if pain returns.    6.  Follow-up with me 2 to 3 months    It was our pleasure caring for your patient today, if there any questions or concerns please do not hesitate to contact us.    30 minutes were spent on the date of the encounter performing chart review, patient visit and documentation in addition to any procedure.    Subjective:   Patient ID: Elidia Monterroso is a 63 year old female.    History of Present Illness: Patient presents for an evaluation of low back pain and bilateral lower extremity claudication symptoms after lumbar epidural.  Immediately after the epidural she did very well and even felt 90% improvement initially now 75% improved.  She is feeling good and did some extra lifting and had significant increased pain lumbar spine and gluteal region that is slowly returning back to baseline.  Pain is a 10/10 at worst 3/10 today 0/10 at best.  Still working lifting walking causes significant pain better with PT.  Taking nabumetone 1000 mg twice daily as needed this week she also took ibuprofen given increase in pain.  Also taking gabapentin 100 mg 3 times daily no side effects.  This may be helping but the injection was also helpful she is not sure.         Imaging: I personally reviewed MRI  images lumbar spine mild facet arthropathy L5-S1 normal disc.  Broad-based disc bulge moderate facet arthropathy L4-5 synovial cyst results in moderate central stenosis.  L3-4 uncovering of the disc mild reactive facet arthropathy.  Grade 1 spondylolisthesis L3-4.  Results in moderate-severe spinal stenosis.    Review of Systems: Pertinent positives: None.  Pertinent negatives: No numbness, tingling or weakness.  No bowel or bladder incontinence.  No urinary retention.  No fevers, unintentional weight loss, balance changes, headaches, frequent falling, difficulty swallowing, or coordination difficulties.  All others reviewed are negative.           Past Medical History:   Diagnosis Date     Bilateral hearing loss 1/17/2020    Hearing aids 2018      Mixed hyperlipidemia 1/17/2020     Restless legs syndrome (RLS) 1/17/2020       The following portions of the patient's history were reviewed and updated as appropriate: allergies, current medications, past family history, past medical history, past social history, past surgical history and problem list.           Objective:   Physical Exam:    /68 (BP Location: Right arm, Patient Position: Sitting)   Pulse 86   SpO2 98%   There is no height or weight on file to calculate BMI.      General: Alert and oriented with normal affect. Attention, knowledge, memory, and language are intact. No acute distress.   Eyes: Sclerae are clear.  Respirations: Unlabored. CV: No lower extremity edema.      Sensation is intact to light touch throughout the   lower extremities.       Manual muscle testing reveals:  Right /Left out of 5        5/5 knee flexors  5/5 knee extensors  5/5 ankle plantar flexors  5/5 ankle dorsiflexors  5/5   ankle evertors      Again, thank you for allowing me to participate in the care of your patient.        Sincerely,        Ulysses Perez DO

## 2023-12-21 NOTE — PROGRESS NOTES
Assessment/Plan:      Elidia was seen today for back pain.    Diagnoses and all orders for this visit:    Spinal stenosis of lumbar region with neurogenic claudication  -     Neurosurgery Referral; Future  -     gabapentin (NEURONTIN) 100 MG capsule; Take 2 capsules (200 mg) by mouth 3 times daily    Spondylolisthesis of lumbar region  -     Neurosurgery Referral; Future    Lumbar facet arthropathy    Left leg weakness         Assessment: Pleasant 63 year old female  otherwise healthy with:     1.  Chronic lumbar spine pain with left greater than right lower extremity radicular pain neurogenic claudication.  She has severe spinal stenosis at L 3-4.  Degenerative severe facet arthritis and spondylolisthesis with lateral recess stenosis and moderate to severe L 3-4 central stenosis with severe facet arthropathy and synovial cyst posteriorly.  She has some associated left great toe extensor weakness which is very mild.  Pain is worsening despite physical therapy 1 year ago doing home exercises regularly but only attended 1 visit due to cost.  Was up to 75% improved following bilateral L4-5 TFESI and then increased pain with some lifting this week and has had a pain flare but that is slowly improving.  Continues to take nabumetone 1000 mg twice a day and gabapentin 100 mg 3 times a day even after the injection.    2.  Left knee Baker's cyst.  Reassurance provided given no ongoing pain.      Discussion:    1.  We discussed the diagnosis and treatment options.  I reviewed the MRI with the patient again today.  She has severe spinal stenosis at L3-4 with spondylolisthesis and continues to take medications gabapentin 3 times daily and nabumetone twice a day despite improvement with the injection she still has severe pain enough to take meds.  She also had a flare of her pain after lifting injury this week mostly low back and buttocks bilaterally.  That pain is slowly improving but still has the claudication symptoms.  We  discussed surgical evaluation medication changes monitoring symptoms.    2.   Recommend increasing gabapentin slowly to 300 mg 3 times daily.    3.  Recommend neurosurgical evaluation to discuss surgical options.    4.  Continue nabumetone 500-1000 mg twice daily as needed.  Did discuss medication safety again today given  that she used ibuprofen with nabumetone on a couple of occasions.  Can take this with Tylenol.    5.  Can consider further injections if pain returns.    6.  Follow-up with me 2 to 3 months    It was our pleasure caring for your patient today, if there any questions or concerns please do not hesitate to contact us.    30 minutes were spent on the date of the encounter performing chart review, patient visit and documentation in addition to any procedure.    Subjective:   Patient ID: Elidia Monterroso is a 63 year old female.    History of Present Illness: Patient presents for an evaluation of low back pain and bilateral lower extremity claudication symptoms after lumbar epidural.  Immediately after the epidural she did very well and even felt 90% improvement initially now 75% improved.  She is feeling good and did some extra lifting and had significant increased pain lumbar spine and gluteal region that is slowly returning back to baseline.  Pain is a 10/10 at worst 3/10 today 0/10 at best.  Still working lifting walking causes significant pain better with PT.  Taking nabumetone 1000 mg twice daily as needed this week she also took ibuprofen given increase in pain.  Also taking gabapentin 100 mg 3 times daily no side effects.  This may be helping but the injection was also helpful she is not sure.         Imaging: I personally reviewed MRI images lumbar spine mild facet arthropathy L5-S1 normal disc.  Broad-based disc bulge moderate facet arthropathy L4-5 synovial cyst results in moderate central stenosis.  L3-4 uncovering of the disc mild reactive facet arthropathy.  Grade 1 spondylolisthesis  L3-4.  Results in moderate-severe spinal stenosis.    Review of Systems: Pertinent positives: None.  Pertinent negatives: No numbness, tingling or weakness.  No bowel or bladder incontinence.  No urinary retention.  No fevers, unintentional weight loss, balance changes, headaches, frequent falling, difficulty swallowing, or coordination difficulties.  All others reviewed are negative.           Past Medical History:   Diagnosis Date    Bilateral hearing loss 1/17/2020    Hearing aids 2018     Mixed hyperlipidemia 1/17/2020    Restless legs syndrome (RLS) 1/17/2020       The following portions of the patient's history were reviewed and updated as appropriate: allergies, current medications, past family history, past medical history, past social history, past surgical history and problem list.           Objective:   Physical Exam:    /68 (BP Location: Right arm, Patient Position: Sitting)   Pulse 86   SpO2 98%   There is no height or weight on file to calculate BMI.      General: Alert and oriented with normal affect. Attention, knowledge, memory, and language are intact. No acute distress.   Eyes: Sclerae are clear.  Respirations: Unlabored. CV: No lower extremity edema.      Sensation is intact to light touch throughout the   lower extremities.       Manual muscle testing reveals:  Right /Left out of 5        5/5 knee flexors  5/5 knee extensors  5/5 ankle plantar flexors  5/5 ankle dorsiflexors  5/5   ankle evertors

## 2023-12-26 NOTE — TELEPHONE ENCOUNTER
SPINE PATIENTS - NEW PROTOCOL PREVISIT    RECORDS RECEIVED FROM: Referring Provider: Ulysses Perez     REASON FOR VISIT: Diagnosis: Spinal stenosis of lumbar region with neurogenic claudication   Spondylolisthesis of lumbar region    Date of Appt: 2024   NOTES (FOR ALL VISITS) STATUS DETAILS   OFFICE NOTE from referring provider Internal Referral and notes in chart   OFFICE NOTE from other specialist Internal PT: last done 2023     DISCHARGE SUMMARY from hospital N/A    DISCHARGE REPORT from ER N/A    OPERATIVE REPORT N/A    EMG REPORT Internal [id number]   MEDICATION LIST N/A    IMAGING  (FOR ALL VISITS)     MRI (HEAD, NECK, SPINE) Internal MRI: 23    XRAY (SPINE) *NEUROSURGERY* Internal XR: 23 in chart   CT (HEAD, NECK, SPINE) N/A

## 2024-01-01 ENCOUNTER — PRE VISIT (OUTPATIENT)
Dept: NEUROSURGERY | Facility: CLINIC | Age: 64
End: 2024-01-01
Payer: COMMERCIAL

## 2024-01-02 ENCOUNTER — OFFICE VISIT (OUTPATIENT)
Dept: NEUROSURGERY | Facility: CLINIC | Age: 64
End: 2024-01-02
Payer: COMMERCIAL

## 2024-01-02 VITALS — SYSTOLIC BLOOD PRESSURE: 107 MMHG | HEART RATE: 71 BPM | DIASTOLIC BLOOD PRESSURE: 69 MMHG | OXYGEN SATURATION: 98 %

## 2024-01-02 DIAGNOSIS — M43.16 SPONDYLOLISTHESIS OF LUMBAR REGION: ICD-10-CM

## 2024-01-02 DIAGNOSIS — M48.062 SPINAL STENOSIS OF LUMBAR REGION WITH NEUROGENIC CLAUDICATION: Primary | ICD-10-CM

## 2024-01-02 PROCEDURE — 99203 OFFICE O/P NEW LOW 30 MIN: CPT | Performed by: SURGERY

## 2024-01-02 ASSESSMENT — PAIN SCALES - GENERAL: PAINLEVEL: MODERATE PAIN (4)

## 2024-01-02 NOTE — NURSING NOTE
"Elidia Monterroso is a 63 year old female who presents for:  Chief Complaint   Patient presents with    Consult     Lumbar  Referred by Dr Perez  Low back pain into buttocks, and then down legs, left>right  -sometimes weakness  Injection in December - minimal relief  Currently doing PT at home        Initial Vitals:  /69   Pulse 71   SpO2 98%  Estimated body mass index is 25.4 kg/m  as calculated from the following:    Height as of 8/16/23: 5' 4\" (1.626 m).    Weight as of 11/8/23: 148 lb (67.1 kg).. There is no height or weight on file to calculate BSA. BP completed using cuff size: regular  Moderate Pain (4)      Adair Estrada  "

## 2024-01-02 NOTE — LETTER
1/2/2024         RE: Elidia Monterroso  [address] [facility] [address]        Dear Colleague,    Thank you for referring your patient, Elidia Monterroso, to the [facility]. Please see a copy of my visit note below.    NEUROSURGERY CONSULTATION NOTE      CONSULTATION ASSESSMENT AND PLAN:    64 yo female who presents with left> right leg pain, numbness and cramping. No long term resolution or improvement of symptoms with conservative management.  X-ray shows anterolisthesis of lumbar 3-4 which does not change on flexion and extension.  MRI of her lumbar spine shows severe spinal canal stenosis at lumbar 3-4 with mild to moderate right and moderate to severe left foraminal narrowing.  She also has facet cysts at lumbar 4-5 on the left which is dorsal to the joint.  Also at the lumbar 4-5 and lumbar 2-3 level she has moderate spinal canal stenosis.  Her claudicating leg symptoms most likely due to her severe stenosis at lumbar 3-4. We discussed risks and benefits of lumbar 3-4 laminectomies, medial facetectomies, foraminotomies. Risks and benefits of lumbar decompression discussed including but not limited to infection, hematoma, nerve damage including paralysis, post op radiculitis, inadequate symptom relief, worsening spondylolisthesis, durotomy, risks associated with the use of general anesthesia, blood clots in the lungs or legs. Currently her symptoms are fluctuating. She is not as bad today. She may try further conservative management prior to considering surgery.     Brooklyn Singh MD      HISTORY OF PRESENTING ILLNESS:    64 yo female who presents with left> right leg pain. Pain is located in posterior buttocks and down posterior legs. Pain is present mostly with standing and walking. Laying down improves her symptoms although still get cramping while sitting. Legs can both go numb at times particularly with walking. Legs feel weak when painful and numb.  No  bowel or bladder loss.     Underwent physical therapy around a year ago. She learned the exercises and then continues them at home. Some improvement  NSAIDs really do help as well.  Gabapentin, nabumetone- some relief  Lumbar epidural- improved initially then returned.     Past Medical History:   Diagnosis Date     Bilateral hearing loss 1/17/2020    Hearing aids 2018      Mixed hyperlipidemia 1/17/2020     Restless legs syndrome (RLS) 1/17/2020       Past Surgical History:   Procedure Laterality Date     CONIZATION  1985     DILATION AND CURETTAGE  1985       REVIEW OF SYSTEMS:  See ROS form under media     MEDICATIONS:    Current Outpatient Medications   Medication Sig Dispense Refill     b complex vitamins tablet [B COMPLEX VITAMINS TABLET] Take 1 tablet by mouth once.       cetirizine (ZYRTEC) 10 MG tablet [CETIRIZINE (ZYRTEC) 10 MG TABLET] Take 10 mg by mouth daily.       cholecalciferol, vitamin D3, 25 mcg (1,000 unit) capsule [CHOLECALCIFEROL, VITAMIN D3, 25 MCG (1,000 UNIT) CAPSULE] Take 1,000 Units by mouth once.       fish oil-omega-3 fatty acids 300-1,000 mg capsule [FISH OIL-OMEGA-3 FATTY ACIDS 300-1,000 MG CAPSULE] Take 1 g by mouth daily.       gabapentin (NEURONTIN) 100 MG capsule Take 2 capsules (200 mg) by mouth 3 times daily 270 capsule 1     glucosamine-chondroitin 500-400 mg cap [GLUCOSAMINE-CHONDROITIN 500-400 MG CAP] Take 1 capsule by mouth 3 (three) times a day.       magnesium 250 MG tablet Take 1 tablet by mouth daily       nabumetone (RELAFEN) 500 MG tablet Take 1-2 tablets (500-1,000 mg) by mouth 2 times daily as needed for moderate pain 90 tablet 1     tretinoin (RETIN-A) 0.05 % external cream Apply topically nightly as needed (skin condition) 45 g 0     bimatoprost (LATISSE) 0.03 % ophthalmic solution [BIMATOPROST (LATISSE) 0.03 % OPHTHALMIC SOLUTION] APPLY 1 DROP EVENLY ALONG THE SKIN OF THE UPPER EYELID AT BASE OF EYELASHES. USE NIGHTLY 5 mL 1         ALLERGIES/SENSITIVITIES:     No  Known Allergies    PERTINENT SOCIAL HISTORY:   Social History     Socioeconomic History     Marital status: Single   Tobacco Use     Smoking status: Former     Packs/day: 1.00     Years: 30.00     Additional pack years: 0.00     Total pack years: 30.00     Types: Cigarettes     Quit date: 2002     Years since quittin.0     Smokeless tobacco: Never   Substance and Sexual Activity     Alcohol use: Yes     Drug use: Not Currently     Sexual activity: Yes     Partners: Male     Birth control/protection: Pill         FAMILY HISTORY:  Family History   Problem Relation Age of Onset     Diabetes Mother      Parkinsonism Mother      Other - See Comments Mother         shingles     Valvular heart disease Mother         s/p surg     Arrhythmia Mother      Bone Cancer Father 53.00     Lymphoma Maternal Grandmother 70.00     Other - See Comments Maternal Grandfather 98.00        shingles     Stomach Cancer Paternal Grandmother 77.00     Cancer Paternal Grandfather 65.00     Cerebrovascular Disease Paternal Uncle 80.00     Coronary Artery Disease Paternal Aunt 85.00     No Known Problems Daughter         PHYSICAL EXAM:   Constitutional: /69   Pulse 71   SpO2 98%      Mental Status: A & O in no acute distress.  Affect is appropriate.  Speech is fluent.  Recent and remote memory are intact.  Attention span and concentration are normal.     Motor: Normal bulk and tone all muscle groups of upper and lower extremities.    Strength: 5/ 5 x 4    Sensory: Sensation intact bilaterally to light touch.     Coordination; Heel/toe/tandem gait intact.  Normal gait and station.     Reflexes; supinator, biceps, triceps, knee/ ankle jerk intact. No gayle's    IMAGING:  I personally reviewed all radiographic images         Cc:   Kelsey Serna             Again, thank you for allowing me to participate in the care of your patient.        Sincerely,        Brooklyn Singh MD

## 2024-01-02 NOTE — PROGRESS NOTES
NEUROSURGERY CONSULTATION NOTE      CONSULTATION ASSESSMENT AND PLAN:    62 yo female who presents with left> right leg pain, numbness and cramping. No long term resolution or improvement of symptoms with conservative management.  X-ray shows anterolisthesis of lumbar 3-4 which does not change on flexion and extension.  MRI of her lumbar spine shows severe spinal canal stenosis at lumbar 3-4 with mild to moderate right and moderate to severe left foraminal narrowing.  She also has facet cysts at lumbar 4-5 on the left which is dorsal to the joint.  Also at the lumbar 4-5 and lumbar 2-3 level she has moderate spinal canal stenosis.  Her claudicating leg symptoms most likely due to her severe stenosis at lumbar 3-4. We discussed risks and benefits of lumbar 3-4 laminectomies, medial facetectomies, foraminotomies. Risks and benefits of lumbar decompression discussed including but not limited to infection, hematoma, nerve damage including paralysis, post op radiculitis, inadequate symptom relief, worsening spondylolisthesis, durotomy, risks associated with the use of general anesthesia, blood clots in the lungs or legs. Currently her symptoms are fluctuating. She is not as bad today. She may try further conservative management prior to considering surgery.     Brooklyn Singh MD      HISTORY OF PRESENTING ILLNESS:    62 yo female who presents with left> right leg pain. Pain is located in posterior buttocks and down posterior legs. Pain is present mostly with standing and walking. Laying down improves her symptoms although still get cramping while sitting. Legs can both go numb at times particularly with walking. Legs feel weak when painful and numb.  No bowel or bladder loss.     Underwent physical therapy around a year ago. She learned the exercises and then continues them at home. Some improvement  NSAIDs really do help as well.  Gabapentin, nabumetone- some relief  Lumbar epidural- improved initially then  returned.     Past Medical History:   Diagnosis Date    Bilateral hearing loss 1/17/2020    Hearing aids 2018     Mixed hyperlipidemia 1/17/2020    Restless legs syndrome (RLS) 1/17/2020       Past Surgical History:   Procedure Laterality Date    CONIZATION  1985    DILATION AND CURETTAGE  1985       REVIEW OF SYSTEMS:  See ROS form under media     MEDICATIONS:    Current Outpatient Medications   Medication Sig Dispense Refill    b complex vitamins tablet [B COMPLEX VITAMINS TABLET] Take 1 tablet by mouth once.      cetirizine (ZYRTEC) 10 MG tablet [CETIRIZINE (ZYRTEC) 10 MG TABLET] Take 10 mg by mouth daily.      cholecalciferol, vitamin D3, 25 mcg (1,000 unit) capsule [CHOLECALCIFEROL, VITAMIN D3, 25 MCG (1,000 UNIT) CAPSULE] Take 1,000 Units by mouth once.      fish oil-omega-3 fatty acids 300-1,000 mg capsule [FISH OIL-OMEGA-3 FATTY ACIDS 300-1,000 MG CAPSULE] Take 1 g by mouth daily.      gabapentin (NEURONTIN) 100 MG capsule Take 2 capsules (200 mg) by mouth 3 times daily 270 capsule 1    glucosamine-chondroitin 500-400 mg cap [GLUCOSAMINE-CHONDROITIN 500-400 MG CAP] Take 1 capsule by mouth 3 (three) times a day.      magnesium 250 MG tablet Take 1 tablet by mouth daily      nabumetone (RELAFEN) 500 MG tablet Take 1-2 tablets (500-1,000 mg) by mouth 2 times daily as needed for moderate pain 90 tablet 1    tretinoin (RETIN-A) 0.05 % external cream Apply topically nightly as needed (skin condition) 45 g 0    bimatoprost (LATISSE) 0.03 % ophthalmic solution [BIMATOPROST (LATISSE) 0.03 % OPHTHALMIC SOLUTION] APPLY 1 DROP EVENLY ALONG THE SKIN OF THE UPPER EYELID AT BASE OF EYELASHES. USE NIGHTLY 5 mL 1         ALLERGIES/SENSITIVITIES:     No Known Allergies    PERTINENT SOCIAL HISTORY:   Social History     Socioeconomic History    Marital status: Single   Tobacco Use    Smoking status: Former     Packs/day: 1.00     Years: 30.00     Additional pack years: 0.00     Total pack years: 30.00     Types: Cigarettes      Quit date: 2002     Years since quittin.0    Smokeless tobacco: Never   Substance and Sexual Activity    Alcohol use: Yes    Drug use: Not Currently    Sexual activity: Yes     Partners: Male     Birth control/protection: Pill         FAMILY HISTORY:  Family History   Problem Relation Age of Onset    Diabetes Mother     Parkinsonism Mother     Other - See Comments Mother         shingles    Valvular heart disease Mother         s/p surg    Arrhythmia Mother     Bone Cancer Father 53.00    Lymphoma Maternal Grandmother 70.00    Other - See Comments Maternal Grandfather 98.00        shingles    Stomach Cancer Paternal Grandmother 77.00    Cancer Paternal Grandfather 65.00    Cerebrovascular Disease Paternal Uncle 80.00    Coronary Artery Disease Paternal Aunt 85.00    No Known Problems Daughter         PHYSICAL EXAM:   Constitutional: /69   Pulse 71   SpO2 98%      Mental Status: A & O in no acute distress.  Affect is appropriate.  Speech is fluent.  Recent and remote memory are intact.  Attention span and concentration are normal.     Motor: Normal bulk and tone all muscle groups of upper and lower extremities.    Strength: 5/ 5 x 4    Sensory: Sensation intact bilaterally to light touch.     Coordination; Heel/toe/tandem gait intact.  Normal gait and station.     Reflexes; supinator, biceps, triceps, knee/ ankle jerk intact. No gayle's    IMAGING:  I personally reviewed all radiographic images         Cc:   Kelsey Serna

## 2024-01-04 ENCOUNTER — MYC MEDICAL ADVICE (OUTPATIENT)
Dept: NEUROSURGERY | Facility: CLINIC | Age: 64
End: 2024-01-04
Payer: COMMERCIAL

## 2024-01-04 DIAGNOSIS — M47.816 LUMBAR FACET ARTHROPATHY: ICD-10-CM

## 2024-01-04 DIAGNOSIS — M48.062 SPINAL STENOSIS OF LUMBAR REGION WITH NEUROGENIC CLAUDICATION: ICD-10-CM

## 2024-01-04 RX ORDER — NABUMETONE 500 MG/1
TABLET, FILM COATED ORAL
Qty: 90 TABLET | Refills: 0 | Status: SHIPPED | OUTPATIENT
Start: 2024-01-04 | End: 2024-01-24

## 2024-01-24 DIAGNOSIS — M47.816 LUMBAR FACET ARTHROPATHY: ICD-10-CM

## 2024-01-24 DIAGNOSIS — M48.062 SPINAL STENOSIS OF LUMBAR REGION WITH NEUROGENIC CLAUDICATION: ICD-10-CM

## 2024-01-24 RX ORDER — NABUMETONE 500 MG/1
TABLET, FILM COATED ORAL
Qty: 90 TABLET | Refills: 0 | Status: SHIPPED | OUTPATIENT
Start: 2024-01-24 | End: 2024-04-10

## 2024-04-10 DIAGNOSIS — M47.816 LUMBAR FACET ARTHROPATHY: ICD-10-CM

## 2024-04-10 DIAGNOSIS — M48.062 SPINAL STENOSIS OF LUMBAR REGION WITH NEUROGENIC CLAUDICATION: ICD-10-CM

## 2024-04-10 RX ORDER — NABUMETONE 500 MG/1
TABLET, FILM COATED ORAL
Qty: 60 TABLET | Refills: 2 | Status: SHIPPED | OUTPATIENT
Start: 2024-04-10 | End: 2024-06-17

## 2024-04-10 RX ORDER — GABAPENTIN 100 MG/1
200 CAPSULE ORAL 3 TIMES DAILY
Qty: 270 CAPSULE | Refills: 1 | Status: SHIPPED | OUTPATIENT
Start: 2024-04-10 | End: 2024-07-05

## 2024-06-07 ENCOUNTER — RADIOLOGY INJECTION OFFICE VISIT (OUTPATIENT)
Dept: PHYSICAL MEDICINE AND REHAB | Facility: CLINIC | Age: 64
End: 2024-06-07
Attending: PHYSICAL MEDICINE & REHABILITATION
Payer: COMMERCIAL

## 2024-06-07 VITALS
RESPIRATION RATE: 16 BRPM | OXYGEN SATURATION: 98 % | TEMPERATURE: 97.5 F | DIASTOLIC BLOOD PRESSURE: 72 MMHG | HEART RATE: 70 BPM | SYSTOLIC BLOOD PRESSURE: 104 MMHG

## 2024-06-07 DIAGNOSIS — M54.16 LUMBAR RADICULOPATHY: ICD-10-CM

## 2024-06-07 PROCEDURE — 64483 NJX AA&/STRD TFRM EPI L/S 1: CPT | Mod: 50 | Performed by: PAIN MEDICINE

## 2024-06-07 RX ORDER — DEXAMETHASONE SODIUM PHOSPHATE 10 MG/ML
INJECTION, SOLUTION INTRAMUSCULAR; INTRAVENOUS
Status: COMPLETED | OUTPATIENT
Start: 2024-06-07 | End: 2024-06-07

## 2024-06-07 RX ORDER — LIDOCAINE HYDROCHLORIDE 10 MG/ML
INJECTION, SOLUTION EPIDURAL; INFILTRATION; INTRACAUDAL; PERINEURAL
Status: COMPLETED | OUTPATIENT
Start: 2024-06-07 | End: 2024-06-07

## 2024-06-07 RX ADMIN — DEXAMETHASONE SODIUM PHOSPHATE 20 MG: 10 INJECTION, SOLUTION INTRAMUSCULAR; INTRAVENOUS at 08:03

## 2024-06-07 RX ADMIN — LIDOCAINE HYDROCHLORIDE 4 ML: 10 INJECTION, SOLUTION EPIDURAL; INFILTRATION; INTRACAUDAL; PERINEURAL at 08:03

## 2024-06-07 ASSESSMENT — PAIN SCALES - GENERAL
PAINLEVEL: SEVERE PAIN (7)
PAINLEVEL: MILD PAIN (3)

## 2024-06-07 NOTE — PATIENT INSTRUCTIONS
DISCHARGE INSTRUCTIONS    During office hours (8:00 a.m.- 4:00 p.m.) questions or concerns may be answered  by calling [facility] Nurses at  [phone].  Messages received after hours will be returned the following business day.      In the case of an emergency, please dial 911 or seek assistance at the nearest Emergency Room/Urgent Care facility.     All Patients:    You may experience an increase in your symptoms for the first 2 days (It may take anywhere between 2 days- 2 weeks for the steroid to have maximum effect).    You may use ice on the injection site, as frequently as 20 minutes each hour if needed.    You may take your pain medicine.    You may continue taking your regular medication after your injection. If you have had a Medial Branch Block you may resume pain medication once your pain diary is completed.    You may shower. No swimming, tub bath or hot tub for 48 hours.  You may remove your bandaid/bandage as soon as you are home.    You may resume light activities, as tolerated.    Resume your usual diet as tolerated.    It is strongly advised that you do not drive for 1-3 hours post injection.    If you have had oral sedation:  Do not drive for 8 hours post injection.      If you have had IV sedation:  Do not drive for 24 hours post injection.  Do not operate hazardous machinery or make important personal/business decisions for 24 hours.      POSSIBLE STEROID SIDE EFFECTS (If steroid/cortisone was used for your procedure)    -If you experience these symptoms, it should only last for a short period    Swelling of the legs              Skin redness (flushing)     Mouth (oral) irritation   Blood sugar (glucose) levels            Sweats                    Mood changes  Headache  Sleeplessness  Weakened immune system for up to 14 days, which could increase the risk of yazmin the COVID-19 virus and/or experiencing more severe symptoms of the disease, if exposed.  Decreased  effectiveness of the flu vaccine if given within 2 weeks of the steroid.         POSSIBLE PROCEDURE SIDE EFFECTS  -Call the Sp[facility] Center if you are concerned  Increased Pain           Increased numbness/tingling      Nausea/Vomiting          Bruising/bleeding at site      Redness or swelling                                              Difficulty walking      Weakness           Fever greater than 100.5    *In the event of a severe headache after an epidural steroid injection that is relieved by lying down, please call the [facility] to speak with a clinical staff member*

## 2024-06-17 DIAGNOSIS — M48.062 SPINAL STENOSIS OF LUMBAR REGION WITH NEUROGENIC CLAUDICATION: ICD-10-CM

## 2024-06-17 DIAGNOSIS — M47.816 LUMBAR FACET ARTHROPATHY: ICD-10-CM

## 2024-06-17 RX ORDER — NABUMETONE 500 MG/1
TABLET, FILM COATED ORAL
Qty: 60 TABLET | Refills: 0 | Status: SHIPPED | OUTPATIENT
Start: 2024-06-17 | End: 2024-07-09

## 2024-07-05 DIAGNOSIS — M48.062 SPINAL STENOSIS OF LUMBAR REGION WITH NEUROGENIC CLAUDICATION: ICD-10-CM

## 2024-07-05 RX ORDER — GABAPENTIN 100 MG/1
200 CAPSULE ORAL 3 TIMES DAILY
Qty: 270 CAPSULE | Refills: 0 | Status: SHIPPED | OUTPATIENT
Start: 2024-07-05 | End: 2024-08-05

## 2024-07-05 NOTE — TELEPHONE ENCOUNTER
Last appointment: 12/21/23  Next appointment: none    Notes/Comments: last ordered 4/10/24 #270 with 1 refill      Rx request(s) has been reviewed.

## 2024-07-07 DIAGNOSIS — M48.062 SPINAL STENOSIS OF LUMBAR REGION WITH NEUROGENIC CLAUDICATION: ICD-10-CM

## 2024-07-07 DIAGNOSIS — M47.816 LUMBAR FACET ARTHROPATHY: ICD-10-CM

## 2024-07-09 RX ORDER — NABUMETONE 500 MG/1
TABLET, FILM COATED ORAL
Qty: 60 TABLET | Refills: 0 | Status: SHIPPED | OUTPATIENT
Start: 2024-07-09 | End: 2024-07-22

## 2024-07-09 NOTE — TELEPHONE ENCOUNTER
Last appointment: 12/21/2023 (06/07/2024 injection)  Next appointment: None    Notes/Comments:      Rx request(s) has been reviewed.

## 2024-07-21 DIAGNOSIS — M48.062 SPINAL STENOSIS OF LUMBAR REGION WITH NEUROGENIC CLAUDICATION: ICD-10-CM

## 2024-07-21 DIAGNOSIS — M47.816 LUMBAR FACET ARTHROPATHY: ICD-10-CM

## 2024-07-22 RX ORDER — NABUMETONE 500 MG/1
TABLET, FILM COATED ORAL
Qty: 60 TABLET | Refills: 0 | Status: SHIPPED | OUTPATIENT
Start: 2024-07-22

## 2024-07-25 ENCOUNTER — MYC MEDICAL ADVICE (OUTPATIENT)
Dept: FAMILY MEDICINE | Facility: CLINIC | Age: 64
End: 2024-07-25
Payer: COMMERCIAL

## 2024-07-25 DIAGNOSIS — L30.9 DERMATITIS: Primary | ICD-10-CM

## 2024-07-30 RX ORDER — TRIAMCINOLONE ACETONIDE 1 MG/G
CREAM TOPICAL 2 TIMES DAILY PRN
Qty: 45 G | Refills: 1 | Status: SHIPPED | OUTPATIENT
Start: 2024-07-30

## 2024-08-03 DIAGNOSIS — M48.062 SPINAL STENOSIS OF LUMBAR REGION WITH NEUROGENIC CLAUDICATION: ICD-10-CM

## 2024-08-05 RX ORDER — GABAPENTIN 100 MG/1
200 CAPSULE ORAL 3 TIMES DAILY
Qty: 270 CAPSULE | Refills: 0 | Status: SHIPPED | OUTPATIENT
Start: 2024-08-05 | End: 2024-08-12

## 2024-08-05 NOTE — TELEPHONE ENCOUNTER
Last appointment:12/21/2023  Next appointment: none    Notes/Comments: last ordered 7/5/24 #270 with no refills      Rx request(s) has been reviewed.

## 2024-08-09 ENCOUNTER — MYC MEDICAL ADVICE (OUTPATIENT)
Dept: PHYSICAL MEDICINE AND REHAB | Facility: CLINIC | Age: 64
End: 2024-08-09
Payer: COMMERCIAL

## 2024-08-09 DIAGNOSIS — M48.062 SPINAL STENOSIS OF LUMBAR REGION WITH NEUROGENIC CLAUDICATION: ICD-10-CM

## 2024-08-12 RX ORDER — GABAPENTIN 300 MG/1
300 CAPSULE ORAL 3 TIMES DAILY
Qty: 90 CAPSULE | Refills: 1 | Status: SHIPPED | OUTPATIENT
Start: 2024-08-12 | End: 2024-10-11

## 2024-08-12 NOTE — TELEPHONE ENCOUNTER
Cued up Rx for 300 mg if you would like to change patient from the 100 mg caps to the 300 mg caps.

## 2024-08-22 SDOH — HEALTH STABILITY: PHYSICAL HEALTH: ON AVERAGE, HOW MANY DAYS PER WEEK DO YOU ENGAGE IN MODERATE TO STRENUOUS EXERCISE (LIKE A BRISK WALK)?: 3 DAYS

## 2024-08-22 SDOH — HEALTH STABILITY: PHYSICAL HEALTH: ON AVERAGE, HOW MANY MINUTES DO YOU ENGAGE IN EXERCISE AT THIS LEVEL?: 30 MIN

## 2024-08-22 ASSESSMENT — SOCIAL DETERMINANTS OF HEALTH (SDOH): HOW OFTEN DO YOU GET TOGETHER WITH FRIENDS OR RELATIVES?: ONCE A WEEK

## 2024-08-22 NOTE — PROGRESS NOTES
Assessment/Plan:   Elidia is a 63 year old female here for physical     Routine adult health maintenance  Physical completed today.  Labs as below.  Up-to-date with immunizations.  Up-to-date with colon cancer screening and mammogram.  - Basic metabolic panel  (Ca, Cl, CO2, Creat, Gluc, K, Na, BUN)  - CBC with platelets  - Vitamin D deficiency screening    Mixed hyperlipidemia  History of hyperlipidemia, not on statin therapy at this time, recheck levels today.  - Lipid panel reflex to direct LDL Fasting    Diabetes mellitus screening  Given age, will screen for diabetes today.  - Hemoglobin A1c       I have had an Advance Directives discussion with the patient.    Follow up: 1 year for physical, sooner as needed    Kelsey Serna MD  [facility]      Subjective:     Elidia Monterroso is a 63 year old female who presents for an annual exam.     Question 8/22/2024 10:29 AM CDT - Filed by Patient   In general, how would you rate your overall physical health? Good   Do you get at least 3 servings of foods that have calcium each day (dairy, green leafy vegetables, etc)? (!) NO   Do you have a special diet? Regular (no restrictions)   How many servings of fruits and vegetables do you eat daily? (!) 2-3   On average, how many sweetened beverages do you drink each day (Examples: soda, juice, sweet tea, etc.)? Do NOT count diet or artificially sweetened beverages. 0-1   On average, how many days per week do you engage in moderate to strenuous exercise (like a brisk walk)? 3 days   On average, how many minutes do you engage in exercise at this level? 30 min   How often do you get together with friends or relatives? Once a week   Have you fallen 2 or more times in the past year? No   Trouble with walking or balance? No   Do you see a dentist two times every year? Yes   Do you feel stress - tense, restless, nervous, or anxious, or unable to sleep at night because your mind is troubled all the time - these  days? Not at all   If you drink alcohol, do you typically have more than 3 drinks per day OR more than 7 drinks per week? No   Do you use any substances not prescribed by a provider, out of habit or for other reasons? No   Have you had any new sexual partners since you were last tested for sexually transmitted infections, including HIV? No   Within the past 12 months, did you worry that your food would run out before you got money to buy more? No   Within the past 12 months, did the food you bought just not last and you didn t have money to get more? No   Do you have housing? (Housing is defined as stable permanent housing and does not include staying ouside in a car, in a tent, in an abandoned building, in an overnight shelter, or couch-surfing.) Yes   Are you worried about losing your housing? No   Within the past 12 months, has lack of transportation kept you from medical appointments, getting your medicines, non-medical meetings or appointments, work, or from getting things that you need? No   Within the past 12 months, have you or your family members you live with been unable to get utilities (heat, electricity) when it was really needed? Yes   Would you like to be contacted by a care coordinator to help with housing and/or utility needs? No   Were you born outside of the [place]? No     Back has been an issue    Health Maintenance reviewed:  Lipid Profile: yes  Glucose Screen: yes  Colonoscopy: up to date  Mammogram: up to date    Gynecologic History  No LMP recorded. Patient is postmenopausal.  Last Pap: 2023. Results were: nml - done with pap smears    Immunization History   Administered Date(s) Administered    COVID-19 Bivalent 12+ (Pfizer) 02/09/2023    COVID-19 MONOVALENT 12+ (Pfizer) 04/17/2021, 05/08/2021, 12/03/2021    COVID-19 Monovalent 12+ (Pfizer 2022) 05/11/2022    Flu, Unspecified 11/01/2020    Hepatitis B, Adult 06/01/2000, 08/02/2000, 01/15/2001    Influenza Vaccine 18-64 (Flublok) 12/15/2021     Influenza Vaccine >6 months,quad, PF 12/02/2022, 10/10/2023    Influenza Vaccine, 6+MO IM (QUADRIVALENT W/PRESERVATIVES) 01/23/2019, 11/06/2019    Influenza, seasonal, injectable, PF 04/03/1998    TD,PF 7+ (Tenivac) 04/03/1998, 01/23/2019    TDAP (Adacel,Boostrix) 04/14/2008     Immunization status: up to date and documented.    Current Outpatient Medications   Medication Sig Dispense Refill    b complex vitamins tablet [B COMPLEX VITAMINS TABLET] Take 1 tablet by mouth once.      cetirizine (ZYRTEC) 10 MG tablet [CETIRIZINE (ZYRTEC) 10 MG TABLET] Take 10 mg by mouth daily.      cholecalciferol, vitamin D3, 25 mcg (1,000 unit) capsule [CHOLECALCIFEROL, VITAMIN D3, 25 MCG (1,000 UNIT) CAPSULE] Take 1,000 Units by mouth once.      fish oil-omega-3 fatty acids 300-1,000 mg capsule [FISH OIL-OMEGA-3 FATTY ACIDS 300-1,000 MG CAPSULE] Take 1 g by mouth daily.      gabapentin (NEURONTIN) 300 MG capsule Take 1 capsule (300 mg) by mouth 3 times daily for 60 days 90 capsule 1    glucosamine-chondroitin 500-400 mg cap [GLUCOSAMINE-CHONDROITIN 500-400 MG CAP] Take 1 capsule by mouth 3 (three) times a day.      magnesium 250 MG tablet Take 1 tablet by mouth daily      nabumetone (RELAFEN) 500 MG tablet TAKE ONE OR TWO TABLETS BY MOUTH TWICE DAILY as needed for moderate pain 60 tablet 0    tretinoin (RETIN-A) 0.05 % external cream Apply topically nightly as needed (skin condition) 45 g 0    triamcinolone (KENALOG) 0.1 % external cream Apply topically 2 times daily as needed for irritation (skin rash) 45 g 1     Past Medical History:   Diagnosis Date    Bilateral hearing loss 1/17/2020    Hearing aids 2018     Mixed hyperlipidemia 1/17/2020    Restless legs syndrome (RLS) 1/17/2020     Past Surgical History:   Procedure Laterality Date    CONIZATION  1985    DILATION AND CURETTAGE  1985     Patient has no known allergies.  Family History   Problem Relation Age of Onset    Diabetes Mother     Parkinsonism Mother     Other - See  Comments Mother         shingles    Valvular heart disease Mother         s/p surg    Arrhythmia Mother     Bone Cancer Father 53.00    Lymphoma Maternal Grandmother 70.00    Other - See Comments Maternal Grandfather 98.00        shingles    Stomach Cancer Paternal Grandmother 77.00    Cancer Paternal Grandfather 65.00    Cerebrovascular Disease Paternal Uncle 80.00    Coronary Artery Disease Paternal Aunt 85.00    No Known Problems Daughter      Social History     Socioeconomic History    Marital status: Single     Spouse name: Not on file    Number of children: Not on file    Years of education: Not on file    Highest education level: Not on file   Occupational History    Not on file   Tobacco Use    Smoking status: Former     Current packs/day: 0.00     Average packs/day: 1 pack/day for 30.0 years (30.0 ttl pk-yrs)     Types: Cigarettes     Start date: 1972     Quit date: 2002     Years since quittin.6    Smokeless tobacco: Never   Substance and Sexual Activity    Alcohol use: Yes    Drug use: Not Currently    Sexual activity: Yes     Partners: Male     Birth control/protection: Pill   Other Topics Concern    Not on file   Social History Narrative    Not on file     Social Determinants of Health     Financial Resource Strain: High Risk (2024)    Financial Resource Strain     Within the past 12 months, have you or your family members you live with been unable to get utilities (heat, electricity) when it was really needed?: Yes   Food Insecurity: Low Risk  (2024)    Food Insecurity     Within the past 12 months, did you worry that your food would run out before you got money to buy more?: No     Within the past 12 months, did the food you bought just not last and you didn t have money to get more?: No   Transportation Needs: Low Risk  (2024)    Transportation Needs     Within the past 12 months, has lack of transportation kept you from medical appointments, getting your medicines,  "non-medical meetings or appointments, work, or from getting things that you need?: No   Physical Activity: Insufficiently Active (8/22/2024)    Exercise Vital Sign     Days of Exercise per Week: 3 days     Minutes of Exercise per Session: 30 min   Stress: No Stress Concern Present (8/22/2024)    [employer] [place] [employer] - Occupational Stress Questionnaire     Feeling of Stress : Not at all   Social Connections: Unknown (8/22/2024)    Social Connection and Isolation Panel [NHANES]     Frequency of Communication with Friends and Family: Not on file     Frequency of Social Gatherings with Friends and Family: Once a week     Attends Shinto Services: Not on file     Active Member of Clubs or Organizations: Not on file     Attends Club or Organization Meetings: Not on file     Marital Status: Not on file   Interpersonal Safety: Low Risk  (8/23/2024)    Interpersonal Safety     Do you feel physically and emotionally safe where you currently live?: Yes     Within the past 12 months, have you been hit, slapped, kicked or otherwise physically hurt by someone?: No     Within the past 12 months, have you been humiliated or emotionally abused in other ways by your partner or ex-partner?: No   Housing Stability: Low Risk  (8/22/2024)    Housing Stability     Do you have housing? : Yes     Are you worried about losing your housing?: No       Review of Systems negative unless noted    Objective:        Vitals:    08/23/24 0828   BP: 109/74   Pulse: 65   Resp: 17   Temp: 98  F (36.7  C)   TempSrc: Temporal   SpO2: 97%   Weight: 68 kg (150 lb)   Height: 1.626 m (5' 4.02\")   PainSc: Mild Pain (2)   PainLoc: Low Back     Body mass index is 25.73 kg/m .    Physical Exam:  General Appearance: Alert, pleasant, appears stated age  Head: Normocephalic, without obvious abnormality  Eyes: PERRL, conjunctiva/corneas clear, EOM's intact  Ears: Normal TM's and external ear canals, both ears  Nose: Nares normal, septum " midline,mucosa normal, no drainage  Throat: Lips, mucosa, and tongue normal; teeth and gums normal; oropharynx is clear  Neck: Supple,without lymphadenopathy, no thyromegaly or nodules noted  Lungs: Clear to auscultation bilaterally, respirations unlabored, no wheezing or crackles  Heart: Regular rate and rhythm, no murmur   Breast:  Normal appearance.  No palpable masses, nipple discharge, or skin changes  Abdomen: Soft, non-tender, no masses, no organomegaly  Extremities: Extremities with strong and symmetric pulses, no cyanosis or edema  Skin: Skin color, texture normal, no rashes or lesions  Neurologic: Grossly normal, no focal deficits

## 2024-08-23 ENCOUNTER — OFFICE VISIT (OUTPATIENT)
Dept: FAMILY MEDICINE | Facility: CLINIC | Age: 64
End: 2024-08-23
Payer: COMMERCIAL

## 2024-08-23 VITALS
RESPIRATION RATE: 17 BRPM | DIASTOLIC BLOOD PRESSURE: 74 MMHG | OXYGEN SATURATION: 97 % | TEMPERATURE: 98 F | HEIGHT: 64 IN | HEART RATE: 65 BPM | BODY MASS INDEX: 25.61 KG/M2 | SYSTOLIC BLOOD PRESSURE: 109 MMHG | WEIGHT: 150 LBS

## 2024-08-23 DIAGNOSIS — E78.2 MIXED HYPERLIPIDEMIA: ICD-10-CM

## 2024-08-23 DIAGNOSIS — Z13.1 DIABETES MELLITUS SCREENING: ICD-10-CM

## 2024-08-23 DIAGNOSIS — Z00.00 ROUTINE ADULT HEALTH MAINTENANCE: Primary | ICD-10-CM

## 2024-08-23 LAB
ANION GAP SERPL CALCULATED.3IONS-SCNC: 11 MMOL/L (ref 7–15)
BUN SERPL-MCNC: 20.8 MG/DL (ref 8–23)
CALCIUM SERPL-MCNC: 9.5 MG/DL (ref 8.8–10.4)
CHLORIDE SERPL-SCNC: 105 MMOL/L (ref 98–107)
CHOLEST SERPL-MCNC: 202 MG/DL
CREAT SERPL-MCNC: 0.67 MG/DL (ref 0.51–0.95)
EGFRCR SERPLBLD CKD-EPI 2021: >90 ML/MIN/1.73M2
ERYTHROCYTE [DISTWIDTH] IN BLOOD BY AUTOMATED COUNT: 13.3 % (ref 10–15)
FASTING STATUS PATIENT QL REPORTED: ABNORMAL
FASTING STATUS PATIENT QL REPORTED: NORMAL
GLUCOSE SERPL-MCNC: 90 MG/DL (ref 70–99)
HBA1C MFR BLD: 5.5 % (ref 0–5.6)
HCO3 SERPL-SCNC: 23 MMOL/L (ref 22–29)
HCT VFR BLD AUTO: 37.6 % (ref 35–47)
HDLC SERPL-MCNC: 83 MG/DL
HGB BLD-MCNC: 12.6 G/DL (ref 11.7–15.7)
LDLC SERPL CALC-MCNC: 110 MG/DL
MCH RBC QN AUTO: 30.7 PG (ref 26.5–33)
MCHC RBC AUTO-ENTMCNC: 33.5 G/DL (ref 31.5–36.5)
MCV RBC AUTO: 92 FL (ref 78–100)
NONHDLC SERPL-MCNC: 119 MG/DL
PLATELET # BLD AUTO: 155 10E3/UL (ref 150–450)
POTASSIUM SERPL-SCNC: 4.7 MMOL/L (ref 3.4–5.3)
RBC # BLD AUTO: 4.11 10E6/UL (ref 3.8–5.2)
SODIUM SERPL-SCNC: 139 MMOL/L (ref 135–145)
TRIGL SERPL-MCNC: 45 MG/DL
VIT D+METAB SERPL-MCNC: 39 NG/ML (ref 20–50)
WBC # BLD AUTO: 5.5 10E3/UL (ref 4–11)

## 2024-08-23 PROCEDURE — 80061 LIPID PANEL: CPT | Performed by: FAMILY MEDICINE

## 2024-08-23 PROCEDURE — 82306 VITAMIN D 25 HYDROXY: CPT | Performed by: FAMILY MEDICINE

## 2024-08-23 PROCEDURE — 80048 BASIC METABOLIC PNL TOTAL CA: CPT | Performed by: FAMILY MEDICINE

## 2024-08-23 PROCEDURE — 99396 PREV VISIT EST AGE 40-64: CPT | Performed by: FAMILY MEDICINE

## 2024-08-23 PROCEDURE — 85027 COMPLETE CBC AUTOMATED: CPT | Performed by: FAMILY MEDICINE

## 2024-08-23 PROCEDURE — 36415 COLL VENOUS BLD VENIPUNCTURE: CPT | Performed by: FAMILY MEDICINE

## 2024-08-23 PROCEDURE — 83036 HEMOGLOBIN GLYCOSYLATED A1C: CPT | Performed by: FAMILY MEDICINE

## 2024-08-23 RX ORDER — GABAPENTIN 100 MG/1
CAPSULE ORAL
COMMUNITY
Start: 2024-08-22 | End: 2024-08-23

## 2024-08-23 ASSESSMENT — PAIN SCALES - GENERAL: PAINLEVEL: MILD PAIN (2)

## 2024-09-24 ENCOUNTER — OFFICE VISIT (OUTPATIENT)
Dept: NEUROSURGERY | Facility: CLINIC | Age: 64
End: 2024-09-24
Payer: COMMERCIAL

## 2024-09-24 VITALS — HEART RATE: 60 BPM | OXYGEN SATURATION: 96 % | DIASTOLIC BLOOD PRESSURE: 77 MMHG | SYSTOLIC BLOOD PRESSURE: 124 MMHG

## 2024-09-24 DIAGNOSIS — M48.062 SPINAL STENOSIS OF LUMBAR REGION WITH NEUROGENIC CLAUDICATION: Primary | ICD-10-CM

## 2024-09-24 PROCEDURE — 99214 OFFICE O/P EST MOD 30 MIN: CPT | Performed by: SURGERY

## 2024-09-24 ASSESSMENT — PAIN SCALES - GENERAL: PAINLEVEL: MODERATE PAIN (4)

## 2024-09-24 NOTE — PROGRESS NOTES
NEUROSURGERY FOLLOW UP  NOTE    Elidia Monterroso comes today in follow-up. Patient is a 62 yo female who presents with left> right leg pain, numbness and cramping. No long term resolution or improvement of symptoms with conservative management.  X-ray shows anterolisthesis of lumbar 3-4 which does not change on flexion and extension.  MRI of her lumbar spine shows severe spinal canal stenosis at lumbar 3-4 with mild to moderate right and moderate to severe left foraminal narrowing.  She also has facet cysts at lumbar 4-5 on the left which is dorsal to the joint.  Also at the lumbar 4-5 and lumbar 2-3 level she has moderate spinal canal stenosis.  Her claudicating leg symptoms most likely due to her central stenosis. We discussed lumbar decompression. Risks and benefits of lumbar decompression discussed including but not limited to infection, hematoma, nerve damage including paralysis, post op radiculitis, inadequate symptom relief, worsening spondylolisthesis, durotomy, risks associated with the use of general anesthesia, blood clots in the lungs or legs. Currently her symptoms are fluctuating. She is not as bad today. She may try further conservative management prior to considering surgery.      Pain is worsening and pinching feeling into back and bilateral buttocks left> right buttocks and posterior legs. Since her symptoms are more bothersome she returned to clinic to re discuss surgical options and expected post operative recovery.       PHYSICAL EXAM:   Constitutional: /77   Pulse 60   SpO2 96%      Mental Status: A & O in no acute distress.  Affect is appropriate.  Speech is fluent.  Recent and remote memory are intact.  Attention span and concentration are normal.     Motor:  Normal bulk and tone all muscle groups of upper and lower extremities.     IMAGING:   I personally reviewed all radiographic images        CONSULTATION ASSESSMENT AND PLAN:    Consider lumbar 2-lumbar 3, lumbar 3- lumbar 4 and  lumbar 4-lumbar 5 minimally invasive laminectomies. Would need an updated KHAI lumbar prior to surgery since last MRI from 9/27/23. Risks and benefits were further discussed. She will think about surgery and likely get a second opinion. If she wishes to proceed she will call our office.     Brooklyn Singh MD      CC:     Kelsey Serna  [address] [facility] [address] [address]

## 2024-09-24 NOTE — NURSING NOTE
"Elidia Monterroso is a 63 year old female who presents for:  Chief Complaint   Patient presents with    Follow Up     Lumbar  Left side hip/buttock pain   -worsening        Initial Vitals:  /77   Pulse 60   SpO2 96%  Estimated body mass index is 25.73 kg/m  as calculated from the following:    Height as of 8/23/24: 5' 4.02\" (1.626 m).    Weight as of 8/23/24: 150 lb (68 kg).. There is no height or weight on file to calculate BSA. BP completed using cuff size: regular  Moderate Pain (4)    Oswestry Disability Index (KEVEN   Juan Macario 1980, All rights reserved. Used with permission)    Section 1 - Pain intensity: The pain is very severe at the moment.  Section 2 - Personal care (washing, dressing, etc.) : It is painful to look after myself and I am slow and careful.  Section 3 - Lifting: Pain prevents me from lifting heavy weights but I can manage light to medium weights if they are conveniently positioned.  Section 4 - Walking: Pain prevents me from walking more than one mile.  Section 5 - Sitting: I can sit in my favorite chair as long as I like.  Section 6 - Standing: I can stand as long as I want without additional pain.  Section 7 - Sleeping: Because of pain I have less than 6 hours sleep.  Section 8 - Sex life (if applicable): My sex life is nearly normal but is very painful.  Section 9 - Social life: My social life is normal and causes me no additional pain.  Section 10 - Traveling: I can travel anywhere but it gives me additional pain.  Sum: 16  Count: 10  Oswestry Score (%): 32 %       Adair Estrada  "

## 2024-09-24 NOTE — LETTER
9/24/2024      Elidia Monterroso  [address]  [facility] [address]      Dear Colleague,    Thank you for referring your patient, Elidia Monterroso, to the [facility]. Please see a copy of my visit note below.    NEUROSURGERY FOLLOW UP  NOTE    Elidia Monterroso comes today in follow-up. Patient is a 64 yo female who presents with left> right leg pain, numbness and cramping. No long term resolution or improvement of symptoms with conservative management.  X-ray shows anterolisthesis of lumbar 3-4 which does not change on flexion and extension.  MRI of her lumbar spine shows severe spinal canal stenosis at lumbar 3-4 with mild to moderate right and moderate to severe left foraminal narrowing.  She also has facet cysts at lumbar 4-5 on the left which is dorsal to the joint.  Also at the lumbar 4-5 and lumbar 2-3 level she has moderate spinal canal stenosis.  Her claudicating leg symptoms most likely due to her central stenosis. We discussed lumbar decompression. Risks and benefits of lumbar decompression discussed including but not limited to infection, hematoma, nerve damage including paralysis, post op radiculitis, inadequate symptom relief, worsening spondylolisthesis, durotomy, risks associated with the use of general anesthesia, blood clots in the lungs or legs. Currently her symptoms are fluctuating. She is not as bad today. She may try further conservative management prior to considering surgery.      Pain is worsening and pinching feeling into back and bilateral buttocks left> right buttocks and posterior legs. Since her symptoms are more bothersome she returned to clinic to re discuss surgical options and expected post operative recovery.       PHYSICAL EXAM:   Constitutional: /77   Pulse 60   SpO2 96%      Mental Status: A & O in no acute distress.  Affect is appropriate.  Speech is fluent.  Recent and remote memory are intact.  Attention span and concentration  are normal.     Motor:  Normal bulk and tone all muscle groups of upper and lower extremities.     IMAGING:   I personally reviewed all radiographic images        CONSULTATION ASSESSMENT AND PLAN:    Consider lumbar 2-lumbar 3, lumbar 3- lumbar 4 and lumbar 4-lumbar 5 minimally invasive laminectomies. Would need an updated KHAI lumbar prior to surgery since last MRI from 9/27/23. Risks and benefits were further discussed. She will think about surgery and likely get a second opinion. If she wishes to proceed she will call our office.     Brooklyn Singh MD      CC:     Kelsey Serna  [address]  [place] [address]      Again, thank you for allowing me to participate in the care of your patient.        Sincerely,        Brooklyn Singh MD

## 2024-09-24 NOTE — PATIENT INSTRUCTIONS
Recommend lumbar 2-lumbar 3, lumbar 3- lumbar 4 and lumbar 4-lumbar 5 minimally invasive laminectomies. Would need an updated MRI and lumbar flexion/extension xray prior to surgery. Last films from 9/27/23.    Risks and benefits of surgery discussed including but not limited to infection, hematoma, nerve damage including paralysis, post op radiculitis, durotomy, inadequate symptom relief, recurrent lumbar disease, risks associated with the use of general anesthesia.         Please call Dr. Francisco Irvin's surgery scheduler, at [phone] when you would like to proceed with surgery.

## 2024-10-02 ENCOUNTER — TELEPHONE (OUTPATIENT)
Dept: PHYSICAL MEDICINE AND REHAB | Facility: CLINIC | Age: 64
End: 2024-10-02
Payer: COMMERCIAL

## 2024-10-02 DIAGNOSIS — M54.16 LUMBAR RADICULOPATHY: Primary | ICD-10-CM

## 2024-10-02 NOTE — TELEPHONE ENCOUNTER
Patient returned call, advised patient that the provider that is doing the second opinion would order what imaging they deem appropriate, as per Dr. Singh's note we would order imaging if patient was moving forward with surgery. Patient was disagreeable to this response and hung up.

## 2024-10-02 NOTE — TELEPHONE ENCOUNTER
PSP:  Ulysses Perez DO   Last clinic visit:  12/21/23 OV; 6/7/24 Bilateral L4-L5 TFESI  Reason for call: Return of back pain. Would like to get another injection  Clinical information:  Patient had Bilateral L4-L5 TFESI on 6/7/24. Reports she had 100% relief for the 1st couple of weeks and then 80% relief up until a couple of weeks ago. Pain is same as previous; worse on left than right at present. Has seen Dr. Singh to discuss surgery and is going to see different surgeon for 2nd opinion. Plans to have surgery after 1st of the year. Inquires about getting updated MRI as was mentioned per Dr. Singh. Order placed in Epic for injection. Injection requirements reviewed in full with patient. Stated understanding.    Advice given to patient: Will check with Dr. Perez of he will place order for updated MRI. She will be called as to whom she will get order from. Per patient, detailed message ok upon call back.    Provider to address: MRI order from you or neurosurgery

## 2024-10-02 NOTE — TELEPHONE ENCOUNTER
Patient seen by Dr. Singh on 9/24/24. Discussed surgical options with her. Patient does plan to get second opinion. Asks about the updated MRI required. Please place order if appropriate and notify patient.     *Per patient, detailed message ok upon call back.

## 2024-10-02 NOTE — TELEPHONE ENCOUNTER
I have not seen the patient in about 10 months.  If Dr. Singh needs an updated MRI then I would recommend Dr. Singh order it as I want to make sure the patient gets the appropriate imaging to guide Dr. Singh for surgery.    I am willing to order 1 other injection for the patient but would recommend the patient return to clinic for follow-up with me for any further conservative management.

## 2024-10-11 DIAGNOSIS — M48.062 SPINAL STENOSIS OF LUMBAR REGION WITH NEUROGENIC CLAUDICATION: ICD-10-CM

## 2024-10-14 RX ORDER — GABAPENTIN 300 MG/1
300 CAPSULE ORAL 3 TIMES DAILY
Qty: 90 CAPSULE | Refills: 0 | Status: SHIPPED | OUTPATIENT
Start: 2024-10-14 | End: 2024-11-13

## 2024-10-14 NOTE — TELEPHONE ENCOUNTER
Last Written Prescription Date:  8/12/2024  Last Fill Quantity: 90,  # refills: 1   Last office visit: 12/21/2023 ; last virtual visit: Visit date not found with prescribing provider:     Future Office Visit:        Requested Prescriptions   Pending Prescriptions Disp Refills    gabapentin (NEURONTIN) 300 MG capsule [Pharmacy Med Name: Gabapentin Oral Capsule 300 MG] 90 capsule 0     Sig: TAKE ONE CAPSULE BY MOUTH THREE TIMES DAILY       There is no refill protocol information for this order

## 2024-10-31 ENCOUNTER — RADIOLOGY INJECTION OFFICE VISIT (OUTPATIENT)
Dept: PHYSICAL MEDICINE AND REHAB | Facility: CLINIC | Age: 64
End: 2024-10-31
Payer: COMMERCIAL

## 2024-10-31 VITALS
HEART RATE: 70 BPM | BODY MASS INDEX: 24.99 KG/M2 | SYSTOLIC BLOOD PRESSURE: 124 MMHG | HEIGHT: 65 IN | OXYGEN SATURATION: 100 % | DIASTOLIC BLOOD PRESSURE: 80 MMHG | WEIGHT: 150 LBS | TEMPERATURE: 97.5 F | RESPIRATION RATE: 16 BRPM

## 2024-10-31 DIAGNOSIS — M54.16 LUMBAR RADICULOPATHY: ICD-10-CM

## 2024-10-31 PROCEDURE — 64483 NJX AA&/STRD TFRM EPI L/S 1: CPT | Mod: 50 | Performed by: PHYSICAL MEDICINE & REHABILITATION

## 2024-10-31 RX ORDER — DEXAMETHASONE SODIUM PHOSPHATE 10 MG/ML
INJECTION, SOLUTION INTRAMUSCULAR; INTRAVENOUS
Status: COMPLETED | OUTPATIENT
Start: 2024-10-31 | End: 2024-10-31

## 2024-10-31 RX ORDER — LIDOCAINE HYDROCHLORIDE 10 MG/ML
INJECTION, SOLUTION EPIDURAL; INFILTRATION; INTRACAUDAL; PERINEURAL
Status: COMPLETED | OUTPATIENT
Start: 2024-10-31 | End: 2024-10-31

## 2024-10-31 RX ADMIN — LIDOCAINE HYDROCHLORIDE 6 ML: 10 INJECTION, SOLUTION EPIDURAL; INFILTRATION; INTRACAUDAL; PERINEURAL at 09:40

## 2024-10-31 RX ADMIN — DEXAMETHASONE SODIUM PHOSPHATE 15 MG: 10 INJECTION, SOLUTION INTRAMUSCULAR; INTRAVENOUS at 09:40

## 2024-10-31 ASSESSMENT — PAIN SCALES - GENERAL
PAINLEVEL_OUTOF10: MILD PAIN (2)
PAINLEVEL_OUTOF10: MODERATE PAIN (4)

## 2024-10-31 NOTE — PATIENT INSTRUCTIONS
DISCHARGE INSTRUCTIONS    During office hours (8:00 a.m.- 4:00 p.m.) questions or concerns may be answered  by calling [facility] Navigation Nurses at  [phone].  Messages received after hours will be returned the following business day.      In the case of an emergency, please dial 911 or seek assistance at the nearest Emergency Room/Urgent Care facility.     All Patients:    You may experience an increase in your symptoms for the first 2 days (It may take anywhere between 2 days- 2 weeks for the steroid to have maximum effect).    You may use ice on the injection site, as frequently as 20 minutes each hour if needed.    You may take your pain medicine.    You may continue taking your regular medication after your injection. If you have had a Medial Branch Block you may resume pain medication once your pain diary is completed.    You may shower. No swimming, tub bath or hot tub for 48 hours.  You may remove your bandaid/bandage as soon as you are home.    You may resume light activities, as tolerated.    Resume your usual diet as tolerated.    If you were told to hold any blood thinning medications you may resume taking them 24 hours after your procedure as prescribed.    It is strongly advised that you do not drive for 1-3 hours post injection.    If you have had oral sedation:  Do not drive for 8 hours post injection.      If you have had IV sedation:  Do not drive for 24 hours post injection.  Do not operate hazardous machinery or make important personal/business decisions for 24 hours.      POSSIBLE STEROID SIDE EFFECTS (If steroid/cortisone was used for your procedure)    -If you experience these symptoms, it should only last for a short period    Swelling of the legs              Skin redness (flushing)     Mouth (oral) irritation   Blood sugar (glucose) levels            Sweats                    Mood changes  Headache  Sleeplessness  Weakened immune system for up to 14 days, which could increase  the risk of yazmin the COVID-19 virus and/or experiencing more severe symptoms of the disease, if exposed.  Decreased effectiveness of the flu vaccine if given within 2 weeks of the steroid.         POSSIBLE PROCEDURE SIDE EFFECTS  -Call the Spine Center if you are concerned  Increased Pain           Increased numbness/tingling      Nausea/Vomiting          Bruising/bleeding at site      Redness or swelling                                              Difficulty walking      Weakness           Fever greater than 100.5    *In the event of a severe headache after an epidural steroid injection that is relieved by lying down, please call the [facility] to speak with a clinical staff member*

## 2024-11-13 DIAGNOSIS — M47.816 LUMBAR FACET ARTHROPATHY: ICD-10-CM

## 2024-11-13 DIAGNOSIS — M48.062 SPINAL STENOSIS OF LUMBAR REGION WITH NEUROGENIC CLAUDICATION: ICD-10-CM

## 2024-11-13 RX ORDER — NABUMETONE 500 MG/1
TABLET, FILM COATED ORAL
Qty: 60 TABLET | Refills: 0 | Status: SHIPPED | OUTPATIENT
Start: 2024-11-13

## 2024-11-13 RX ORDER — GABAPENTIN 300 MG/1
300 CAPSULE ORAL 3 TIMES DAILY
Qty: 90 CAPSULE | Refills: 0 | Status: SHIPPED | OUTPATIENT
Start: 2024-11-13

## 2024-11-25 DIAGNOSIS — M47.816 LUMBAR FACET ARTHROPATHY: ICD-10-CM

## 2024-11-25 DIAGNOSIS — M48.062 SPINAL STENOSIS OF LUMBAR REGION WITH NEUROGENIC CLAUDICATION: ICD-10-CM

## 2024-11-25 RX ORDER — NABUMETONE 500 MG/1
TABLET, FILM COATED ORAL
Qty: 60 TABLET | Refills: 0 | Status: SHIPPED | OUTPATIENT
Start: 2024-11-25

## 2024-11-25 NOTE — TELEPHONE ENCOUNTER
Last office visit 12/21/2023.  
Will provide 1 further refill then she will need to follow-up.  
<-- Click to add NO pertinent Family History

## 2024-12-17 ENCOUNTER — MYC REFILL (OUTPATIENT)
Dept: PHYSICAL MEDICINE AND REHAB | Facility: CLINIC | Age: 64
End: 2024-12-17
Payer: COMMERCIAL

## 2024-12-17 DIAGNOSIS — M48.062 SPINAL STENOSIS OF LUMBAR REGION WITH NEUROGENIC CLAUDICATION: ICD-10-CM

## 2024-12-18 RX ORDER — GABAPENTIN 300 MG/1
300 CAPSULE ORAL 3 TIMES DAILY
Qty: 90 CAPSULE | Refills: 0 | Status: SHIPPED | OUTPATIENT
Start: 2024-12-18

## 2024-12-18 NOTE — TELEPHONE ENCOUNTER
Last Written Prescription Date:  11/13/2024  Last Fill Quantity: 90,  # refills: 0   Last office visit: 12/21/2023 ; last virtual visit: Visit date not found with prescribing provider:     Future Office Visit:        Requested Prescriptions   Pending Prescriptions Disp Refills    gabapentin (NEURONTIN) 300 MG capsule 90 capsule 0     Sig: Take 1 capsule (300 mg) by mouth 3 times daily.       There is no refill protocol information for this order

## 2025-01-24 NOTE — PROGRESS NOTES
Assessment/Plan:      Elidia was seen today for back pain.    Diagnoses and all orders for this visit:    Spinal stenosis of lumbar region with neurogenic claudication  -     DULoxetine (CYMBALTA) 20 MG capsule; Take 1 capsule (20 mg) by mouth 2 times daily.  -     XR Lumbar Flex/Ext 2/3 Views; Future  -     MR Lumbar Spine w/o Contrast; Future  -     Orthopedic  Referral; Future  -     Pain Transforaminal Veena Inj Lmbscrl 1 Lvl Delio; Future    Lumbar facet arthropathy  -     DULoxetine (CYMBALTA) 20 MG capsule; Take 1 capsule (20 mg) by mouth 2 times daily.  -     XR Lumbar Flex/Ext 2/3 Views; Future  -     MR Lumbar Spine w/o Contrast; Future  -     Orthopedic  Referral; Future    Spondylolisthesis of lumbar region  -     XR Lumbar Flex/Ext 2/3 Views; Future  -     MR Lumbar Spine w/o Contrast; Future  -     Orthopedic  Referral; Future    Myofascial pain         Assessment: Pleasant 64 year old female otherwise healthy with:     1.  Chronic lumbar spine pain with left greater than right lower extremity radicular pain neurogenic claudication.  She has severe spinal stenosis at L 3-4.  Degenerative severe facet arthritis and spondylolisthesis with lateral recess stenosis and moderate to severe L 3-4 central stenosis with severe facet arthropathy and synovial cyst posteriorly.  She has some associated left great toe extensor weakness which is very mild.  Pain is worsening despite physical therapy and home exercises regularly but only attended 1 visit due to cost.  Was up to 75% improved following bilateral L4-5 TFESI in November 2023 with subsequent repeat Injection in June and then October 2024.  Continues to take nabumetone 500 mg twice a day and gabapentin 100 mg 3 times a day is getting some drowsiness.  Pain waxing waning and typically has done well following the injections but only temporarily.             Discussion:    1.  We had an extensive discussion today regarding the lumbar  spinal stenosis and facet arthropathy with spondylolisthesis.  We discussed second opinion from surgery, medication changes, further injections.    2.  MRI lumbar spine and flexion-extension x-rays evaluate for progressive spinal stenosis or instability respectively..    3.  Recommend repeat bilateral L4-5 TFESI when needed as her pain returns with lower extremity radicular pain.  Currently having low back pain only more manageable with medications..    4.  Gabapentin: She has some drowsiness will decrease this to 100 mg twice a day.    5.  Will add duloxetine 20 mg daily at bedtime for her neuropathic pain and pain related to spondylosis.    6.  Recommend trial of stopping nabumetone    7.  Trial adding Tylenol 1000 mg 3 times daily in place of NSAIDs for temporary change in medication.    8.  She would like a second opinion from orthopedics.  Dr. Ricardo perform surgery on her sister and will refer to Dr. Ricardo at [facility] per patient request.    9.  Follow-up with me in 1 month.    It was our pleasure caring for your patient today, if there any questions or concerns please do not hesitate to contact us.      Subjective:   Patient ID: Elidia Monterroso is a 64 year old female.    History of Present Illness: Patient presents for follow-up of lumbar spine pain.  Her lumbar spine bilateral lower extremity radicular pain with standing and walking/claudication symptoms.  Currently her leg symptoms are improved after lumbar epidural steroid injection October 31, 2024.  Doing very well over 50% improved but still taking nabumetone 500 mg twice a day and gabapentin 100 mg 3 times daily.  Some drowsiness related to gabapentin.  Her back pain now is at the lumbosacral junction only with standing walking better with stretching some movement change position pain is 8/10 at worst 2/10 today 0/10 at best.  Her leg pain does increase at times.  She does her physical therapy exercises daily.    I reviewed the note from Dr. Singh  since last visit January 1, 2024 1 year ago.  She discussed a L3-4 laminectomy medial facetectomy and foraminotomy.     Has wanted to avoid surgery if possible.    Imaging: MRI lumbar spine from September 2023 images personally reviewed again today decision-making purposes.  L3 and L4 spondylolisthesis with broad-based disc bulge serpiginous nerve roots related to severe spinal stenosis.  Severe facet arthropathy L4-5 moderate to severe L3-4.  There is also moderate central stenosis L4-5 and moderate to severe central stenosis L2-3.    Review of Systems: Pertinent positives: None.  Pertinent negatives: No numbness, tingling or weakness.  No bowel or bladder incontinence.  No urinary retention.  No fevers, unintentional weight loss, balance changes, headaches, frequent falling, difficulty swallowing, or coordination difficulties.  All others reviewed are negative.           Current Outpatient Medications   Medication Sig Dispense Refill    b complex vitamins tablet [B COMPLEX VITAMINS TABLET] Take 1 tablet by mouth once.      cetirizine (ZYRTEC) 10 MG tablet [CETIRIZINE (ZYRTEC) 10 MG TABLET] Take 10 mg by mouth daily.      cholecalciferol, vitamin D3, 25 mcg (1,000 unit) capsule [CHOLECALCIFEROL, VITAMIN D3, 25 MCG (1,000 UNIT) CAPSULE] Take 1,000 Units by mouth once.      fish oil-omega-3 fatty acids 300-1,000 mg capsule [FISH OIL-OMEGA-3 FATTY ACIDS 300-1,000 MG CAPSULE] Take 1 g by mouth daily.      gabapentin (NEURONTIN) 300 MG capsule Take 1 capsule (300 mg) by mouth 3 times daily. 90 capsule 0    glucosamine-chondroitin 500-400 mg cap [GLUCOSAMINE-CHONDROITIN 500-400 MG CAP] Take 1 capsule by mouth 3 (three) times a day.      magnesium 250 MG tablet Take 1 tablet by mouth daily      nabumetone (RELAFEN) 500 MG tablet TAKE ONE OR TWO TABLETS BY MOUTH TWICE DAILY as needed for moderate pain 30 tablet 0    tretinoin (RETIN-A) 0.05 % external cream Apply topically nightly as needed (skin condition) 45 g 0     triamcinolone (KENALOG) 0.1 % external cream Apply topically 2 times daily as needed for irritation (skin rash) 45 g 1     No current facility-administered medications for this visit.       Past Medical History:   Diagnosis Date    Bilateral hearing loss 1/17/2020    Hearing aids 2018     Mixed hyperlipidemia 1/17/2020    Restless legs syndrome (RLS) 1/17/2020       The following portions of the patient's history were reviewed and updated as appropriate: allergies, current medications, past family history, past medical history, past social history, past surgical history and problem list.           Objective:   Physical Exam:    /74   Pulse 63   There is no height or weight on file to calculate BMI.      General: Alert and oriented with normal affect. Attention, knowledge, memory, and language are intact. No acute distress.   Eyes: Sclerae are clear.  Respirations: Unlabored. CV: No lower extremity edema.    Gait:  Nonantalgic  Mildly reduced lumbar extension full flexion.  Sensation is intact to light touch throughout the lower extremities.  Reflexes are   2+ patellar and 1+ Achilles      Manual muscle testing reveals:  Right /Left out of 5     5/5 hip flexors  5/5 knee flexors  5/5 knee extensors  5/5 ankle plantar flexors  5/5 ankle dorsiflexors  5/5  EHL

## 2025-01-28 ENCOUNTER — OFFICE VISIT (OUTPATIENT)
Dept: PHYSICAL MEDICINE AND REHAB | Facility: CLINIC | Age: 65
End: 2025-01-28
Payer: COMMERCIAL

## 2025-01-28 VITALS — DIASTOLIC BLOOD PRESSURE: 74 MMHG | SYSTOLIC BLOOD PRESSURE: 112 MMHG | HEART RATE: 63 BPM

## 2025-01-28 DIAGNOSIS — M43.16 SPONDYLOLISTHESIS OF LUMBAR REGION: ICD-10-CM

## 2025-01-28 DIAGNOSIS — M79.18 MYOFASCIAL PAIN: ICD-10-CM

## 2025-01-28 DIAGNOSIS — M47.816 LUMBAR FACET ARTHROPATHY: ICD-10-CM

## 2025-01-28 DIAGNOSIS — M48.062 SPINAL STENOSIS OF LUMBAR REGION WITH NEUROGENIC CLAUDICATION: Primary | ICD-10-CM

## 2025-01-28 PROCEDURE — 99214 OFFICE O/P EST MOD 30 MIN: CPT | Performed by: PHYSICAL MEDICINE & REHABILITATION

## 2025-01-28 RX ORDER — DULOXETIN HYDROCHLORIDE 20 MG/1
20 CAPSULE, DELAYED RELEASE ORAL 2 TIMES DAILY
Qty: 30 CAPSULE | Refills: 1 | Status: SHIPPED | OUTPATIENT
Start: 2025-01-28

## 2025-01-28 ASSESSMENT — PAIN SCALES - GENERAL: PAINLEVEL_OUTOF10: MILD PAIN (3)

## 2025-01-28 NOTE — PATIENT INSTRUCTIONS
An MRI and xray was ordered for you today.  You will be contacted by scheduling within 3 days.    If you are not contacted, please call Radiology at [phone].    See Dr Bower at [facility] for a second opinion  Start Duloxetine 20mg daily at bedtime for arthritis and nerve pain  Decrease gabapentin to 100mg in the morning and evening  Try stopping nabumetone  Take tylenol 1000mg three times daily as needed.  A Lumbar epidural injection has been ordered today. Please schedule this injection at least  2 weeks from now to allow time for insurance prior authorization. On the day of your injection, you cannot be sick or taking antibiotics. If you become sick and are prescribed, please call the clinic so your injection can be rescheduled for once you have completed your antibiotics.  It is recommended that you do not have a vaccination within 2 weeks of your procedure if it will contain steroids, as this may make the vaccination less effective.  You will need to bring a  with you for your injection. If you have any questions or concerns prior to your injection, please do not hesitate to call the nurse navigation line at [phone].

## 2025-01-28 NOTE — LETTER
1/28/2025      Elidia Monterroso  [address] [address]      Dear Colleague,    Thank you for referring your patient, Elidia Monterroso, to the [facility]. Please see a copy of my visit note below.    Assessment/Plan:      Elidia was seen today for back pain.    Diagnoses and all orders for this visit:    Spinal stenosis of lumbar region with neurogenic claudication  -     DULoxetine (CYMBALTA) 20 MG capsule; Take 1 capsule (20 mg) by mouth 2 times daily.  -     XR Lumbar Flex/Ext 2/3 Views; Future  -     MR Lumbar Spine w/o Contrast; Future  -     Orthopedic  Referral; Future  -     Pain Transforaminal Veena Inj Lmbscrl 1 Lvl Delio; Future    Lumbar facet arthropathy  -     DULoxetine (CYMBALTA) 20 MG capsule; Take 1 capsule (20 mg) by mouth 2 times daily.  -     XR Lumbar Flex/Ext 2/3 Views; Future  -     MR Lumbar Spine w/o Contrast; Future  -     Orthopedic  Referral; Future    Spondylolisthesis of lumbar region  -     XR Lumbar Flex/Ext 2/3 Views; Future  -     MR Lumbar Spine w/o Contrast; Future  -     Orthopedic  Referral; Future    Myofascial pain         Assessment: Pleasant 64 year old female otherwise healthy with:     1.  Chronic lumbar spine pain with left greater than right lower extremity radicular pain neurogenic claudication.  She has severe spinal stenosis at L 3-4.  Degenerative severe facet arthritis and spondylolisthesis with lateral recess stenosis and moderate to severe L 3-4 central stenosis with severe facet arthropathy and synovial cyst posteriorly.  She has some associated left great toe extensor weakness which is very mild.  Pain is worsening despite physical therapy and home exercises regularly but only attended 1 visit due to cost.  Was up to 75% improved following bilateral L4-5 TFESI in November 2023 with subsequent repeat Injection in June and then October 2024.  Continues to take nabumetone 500 mg twice a day  and gabapentin 100 mg 3 times a day is getting some drowsiness.  Pain waxing waning and typically has done well following the injections but only temporarily.             Discussion:    1.  We had an extensive discussion today regarding the lumbar spinal stenosis and facet arthropathy with spondylolisthesis.  We discussed second opinion from surgery, medication changes, further injections.    2.  MRI lumbar spine and flexion-extension x-rays evaluate for progressive spinal stenosis or instability respectively..    3.  Recommend repeat bilateral L4-5 TFESI when needed as her pain returns with lower extremity radicular pain.  Currently having low back pain only more manageable with medications..    4.  Gabapentin: She has some drowsiness will decrease this to 100 mg twice a day.    5.  Will add duloxetine 20 mg daily at bedtime for her neuropathic pain and pain related to spondylosis.    6.  Recommend trial of stopping nabumetone    7.  Trial adding Tylenol 1000 mg 3 times daily in place of NSAIDs for temporary change in medication.    8.  She would like a second opinion from orthopedics.  Dr. Ricardo perform surgery on her sister and will refer to Dr. Ricardo at [facility] per patient request.    9.  Follow-up with me in 1 month.    It was our pleasure caring for your patient today, if there any questions or concerns please do not hesitate to contact us.      Subjective:   Patient ID: Elidia Monterroso is a 64 year old female.    History of Present Illness: Patient presents for follow-up of lumbar spine pain.  Her lumbar spine bilateral lower extremity radicular pain with standing and walking/claudication symptoms.  Currently her leg symptoms are improved after lumbar epidural steroid injection October 31, 2024.  Doing very well over 50% improved but still taking nabumetone 500 mg twice a day and gabapentin 100 mg 3 times daily.  Some drowsiness related to gabapentin.  Her back pain now is at the lumbosacral junction only  with standing walking better with stretching some movement change position pain is 8/10 at worst 2/10 today 0/10 at best.  Her leg pain does increase at times.  She does her physical therapy exercises daily.    I reviewed the note from Dr. Singh since last visit January 1, 2024 1 year ago.  She discussed a L3-4 laminectomy medial facetectomy and foraminotomy.     Has wanted to avoid surgery if possible.    Imaging: MRI lumbar spine from September 2023 images personally reviewed again today decision-making purposes.  L3 and L4 spondylolisthesis with broad-based disc bulge serpiginous nerve roots related to severe spinal stenosis.  Severe facet arthropathy L4-5 moderate to severe L3-4.  There is also moderate central stenosis L4-5 and moderate to severe central stenosis L2-3.    Review of Systems: Pertinent positives: None.  Pertinent negatives: No numbness, tingling or weakness.  No bowel or bladder incontinence.  No urinary retention.  No fevers, unintentional weight loss, balance changes, headaches, frequent falling, difficulty swallowing, or coordination difficulties.  All others reviewed are negative.           Current Outpatient Medications   Medication Sig Dispense Refill     b complex vitamins tablet [B COMPLEX VITAMINS TABLET] Take 1 tablet by mouth once.       cetirizine (ZYRTEC) 10 MG tablet [CETIRIZINE (ZYRTEC) 10 MG TABLET] Take 10 mg by mouth daily.       cholecalciferol, vitamin D3, 25 mcg (1,000 unit) capsule [CHOLECALCIFEROL, VITAMIN D3, 25 MCG (1,000 UNIT) CAPSULE] Take 1,000 Units by mouth once.       fish oil-omega-3 fatty acids 300-1,000 mg capsule [FISH OIL-OMEGA-3 FATTY ACIDS 300-1,000 MG CAPSULE] Take 1 g by mouth daily.       gabapentin (NEURONTIN) 300 MG capsule Take 1 capsule (300 mg) by mouth 3 times daily. 90 capsule 0     glucosamine-chondroitin 500-400 mg cap [GLUCOSAMINE-CHONDROITIN 500-400 MG CAP] Take 1 capsule by mouth 3 (three) times a day.       magnesium 250 MG tablet Take 1  tablet by mouth daily       nabumetone (RELAFEN) 500 MG tablet TAKE ONE OR TWO TABLETS BY MOUTH TWICE DAILY as needed for moderate pain 30 tablet 0     tretinoin (RETIN-A) 0.05 % external cream Apply topically nightly as needed (skin condition) 45 g 0     triamcinolone (KENALOG) 0.1 % external cream Apply topically 2 times daily as needed for irritation (skin rash) 45 g 1     No current facility-administered medications for this visit.       Past Medical History:   Diagnosis Date     Bilateral hearing loss 1/17/2020    Hearing aids 2018      Mixed hyperlipidemia 1/17/2020     Restless legs syndrome (RLS) 1/17/2020       The following portions of the patient's history were reviewed and updated as appropriate: allergies, current medications, past family history, past medical history, past social history, past surgical history and problem list.           Objective:   Physical Exam:    /74   Pulse 63   There is no height or weight on file to calculate BMI.      General: Alert and oriented with normal affect. Attention, knowledge, memory, and language are intact. No acute distress.   Eyes: Sclerae are clear.  Respirations: Unlabored. CV: No lower extremity edema.    Gait:  Nonantalgic  Mildly reduced lumbar extension full flexion.  Sensation is intact to light touch throughout the lower extremities.  Reflexes are   2+ patellar and 1+ Achilles      Manual muscle testing reveals:  Right /Left out of 5     5/5 hip flexors  5/5 knee flexors  5/5 knee extensors  5/5 ankle plantar flexors  5/5 ankle dorsiflexors  5/5  EHL       Again, thank you for allowing me to participate in the care of your patient.        Sincerely,        Ulysses Perez DO    Electronically signed

## 2025-01-29 ENCOUNTER — PATIENT OUTREACH (OUTPATIENT)
Dept: CARE COORDINATION | Facility: CLINIC | Age: 65
End: 2025-01-29
Payer: COMMERCIAL

## 2025-02-07 ENCOUNTER — TRANSFERRED RECORDS (OUTPATIENT)
Dept: HEALTH INFORMATION MANAGEMENT | Facility: CLINIC | Age: 65
End: 2025-02-07
Payer: COMMERCIAL

## 2025-02-10 ENCOUNTER — PATIENT OUTREACH (OUTPATIENT)
Dept: CARE COORDINATION | Facility: CLINIC | Age: 65
End: 2025-02-10
Payer: COMMERCIAL

## 2025-03-07 NOTE — PROGRESS NOTES
Assessment/Plan:      Elidia was seen today for back pain.    Diagnoses and all orders for this visit:    Spinal stenosis of lumbar region with neurogenic claudication  -     DULoxetine (CYMBALTA) 20 MG capsule; Take 2 capsules (40 mg) by mouth daily.  -     EMG; Future    Lumbar facet arthropathy  -     DULoxetine (CYMBALTA) 20 MG capsule; Take 2 capsules (40 mg) by mouth daily.    Spondylolisthesis of lumbar region    Myofascial pain    Lumbar radicular pain  -     EMG; Future         Assessment: Pleasant 64 year old female otherwise healthy with:     1.  Chronic lumbar spine pain with left greater than right lower extremity radicular pain neurogenic claudication.  Persistent severe spinal stenosis L3-4 and L4-5 with spondylolisthesis at those levels and no instability from flexion to extension.  Degenerative severe facet arthritis and spondylolisthesis with lateral recess stenosis and moderate to severe L 3-4 central stenosis with severe facet arthropathy and synovial cyst posteriorly.  She has some associated left great toe extensor weakness which is very mild.  Pain is worsening despite physical therapy and home exercises regularly but only attended 1 visit due to cost.  Was up to 75% improved following bilateral L4-5 TFESI in November 2023 with subsequent repeat Injection in June and then October 2024.    Pain waxing waning and typically has done well following the injections but only temporarily.  Symptoms have improved some with duloxetine 20 mg daily.  Getting a second opinion from Dr. Ricardo at [facility] orthopedics.         Discussion:    1.  We discussed the new MRI images there is progression of the spinal stenosis at L4-5 as well as severe stenosis L3-4 with a transitional S1 vertebrae.  She is having more tolerable symptoms with regards to the radicular pain but still having issues.  We discussed options of further diagnostics medication management injections.  She is not wanting injection at this  time.    2.  recommend EMG to evaluate for radiculopathy if positive would help determine if she would benefit from surgical intervention more urgently.    3.  Increase duloxetine to 40 mg daily.    4.  Follow-up at earliest convenience for EMG.    It was our pleasure caring for your patient today, if there any questions or concerns please do not hesitate to contact us.      Subjective:   Patient ID: Elidia Monterroso is a 64 year old female.    History of Present Illness: Patient presents for follow-up of lumbar spine pain with bilateral lower extremity radicular pain neurogenic claudication.  Continues to have back pain lumbosacral junction gluteal region.  Lower extremity paresthesias have improved some still having claudication symptoms with standing walking cramping in the legs at night.  Taking duloxetine 20 mg daily feels that it is helping with claudication symptoms also using red light therapy doing physical therapy stretches daily along with CBD which helps but still prolonged standing and walking causes pain.  Pain is an 8/10 at worst 3/10 today 0/10 at best.  Has had a bone density since last visit that was done yesterday no report available.  Seeing Dr. Ricardo at [facility] for second opinion regarding surgical intervention.  Taking Aleve regularly but no gabapentin or nabumetone.      Imaging: MRI report and images were personally reviewed from [facility] and discussed with the patient.   MRI of the lumbar spine from [facility] February 17 was reviewed along with lumbar flexion-extension x-rays.  Severe stenosis at L4-5 progressed from prior MRI impingement of the L5 nerves moderate right foraminal stenosis.  Severe central stenosis L3-4 with lateral recess stenosis and moderate severe left foraminal stenosis.  Moderate L2-3 stenosis.  Transitional L5 segment.    Lumbar flexion-extension x-rays shows grade 1 spondylolisthesis L3 and L4 and trace L4 on L5 with no instability from flexion to  extension.    Review of Systems: Some paresthesias in the legs.  Denies weakness bowel or bladder incontinence headaches swallowing issues fevers unintentional weight loss.           Current Outpatient Medications   Medication Sig Dispense Refill    b complex vitamins tablet [B COMPLEX VITAMINS TABLET] Take 1 tablet by mouth once.      cetirizine (ZYRTEC) 10 MG tablet [CETIRIZINE (ZYRTEC) 10 MG TABLET] Take 10 mg by mouth daily.      cholecalciferol, vitamin D3, 25 mcg (1,000 unit) capsule [CHOLECALCIFEROL, VITAMIN D3, 25 MCG (1,000 UNIT) CAPSULE] Take 1,000 Units by mouth once.      DULoxetine (CYMBALTA) 20 MG capsule Take 1 capsule (20 mg) by mouth daily. 30 capsule 1    fish oil-omega-3 fatty acids 300-1,000 mg capsule [FISH OIL-OMEGA-3 FATTY ACIDS 300-1,000 MG CAPSULE] Take 1 g by mouth daily.      gabapentin (NEURONTIN) 300 MG capsule Take 1 capsule (300 mg) by mouth 3 times daily. 90 capsule 0    glucosamine-chondroitin 500-400 mg cap [GLUCOSAMINE-CHONDROITIN 500-400 MG CAP] Take 1 capsule by mouth 3 (three) times a day.      magnesium 250 MG tablet Take 1 tablet by mouth daily      nabumetone (RELAFEN) 500 MG tablet TAKE ONE OR TWO TABLETS BY MOUTH TWICE DAILY as needed for moderate pain 30 tablet 0    tretinoin (RETIN-A) 0.05 % external cream Apply topically nightly as needed (skin condition) 45 g 0    triamcinolone (KENALOG) 0.1 % external cream Apply topically 2 times daily as needed for irritation (skin rash) 45 g 1     No current facility-administered medications for this visit.       Past Medical History:   Diagnosis Date    Bilateral hearing loss 1/17/2020    Hearing aids 2018     Mixed hyperlipidemia 1/17/2020    Restless legs syndrome (RLS) 1/17/2020       The following portions of the patient's history were reviewed and updated as appropriate: allergies, current medications, past family history, past medical history, past social history, past surgical history and problem list.           Objective:    Physical Exam:    /80   Pulse 65   There is no height or weight on file to calculate BMI.      General: Alert and oriented with normal affect. Attention, knowledge, memory, and language are intact. No acute distress.   Eyes: Sclerae are clear.  Respirations: Unlabored. CV: No lower extremity edema.         Sensation is intact to light touch throughout the  lower extremities.       Manual muscle testing reveals:  Right /Left out of 5     5/5 hip flexors  5/5 knee flexors  5/5 knee extensors  5/5 ankle plantar flexors  5/5 ankle dorsiflexors  5/5  EHL

## 2025-03-17 ENCOUNTER — ANCILLARY PROCEDURE (OUTPATIENT)
Dept: BONE DENSITY | Facility: CLINIC | Age: 65
End: 2025-03-17
Attending: PHYSICIAN ASSISTANT
Payer: COMMERCIAL

## 2025-03-17 DIAGNOSIS — Z00.00 HEALTHCARE MAINTENANCE: ICD-10-CM

## 2025-03-17 PROCEDURE — 77091 TBS TECHL CALCULATION ONLY: CPT

## 2025-03-17 PROCEDURE — 77080 DXA BONE DENSITY AXIAL: CPT | Mod: TC

## 2025-03-18 ENCOUNTER — OFFICE VISIT (OUTPATIENT)
Dept: PHYSICAL MEDICINE AND REHAB | Facility: CLINIC | Age: 65
End: 2025-03-18
Payer: COMMERCIAL

## 2025-03-18 VITALS — SYSTOLIC BLOOD PRESSURE: 125 MMHG | HEART RATE: 65 BPM | DIASTOLIC BLOOD PRESSURE: 80 MMHG

## 2025-03-18 DIAGNOSIS — M43.16 SPONDYLOLISTHESIS OF LUMBAR REGION: ICD-10-CM

## 2025-03-18 DIAGNOSIS — M54.16 LUMBAR RADICULAR PAIN: ICD-10-CM

## 2025-03-18 DIAGNOSIS — M79.18 MYOFASCIAL PAIN: ICD-10-CM

## 2025-03-18 DIAGNOSIS — M47.816 LUMBAR FACET ARTHROPATHY: ICD-10-CM

## 2025-03-18 DIAGNOSIS — M48.062 SPINAL STENOSIS OF LUMBAR REGION WITH NEUROGENIC CLAUDICATION: Primary | ICD-10-CM

## 2025-03-18 PROCEDURE — 99214 OFFICE O/P EST MOD 30 MIN: CPT | Performed by: PHYSICAL MEDICINE & REHABILITATION

## 2025-03-18 RX ORDER — DULOXETIN HYDROCHLORIDE 20 MG/1
40 CAPSULE, DELAYED RELEASE ORAL DAILY
Qty: 60 CAPSULE | Refills: 2 | Status: SHIPPED | OUTPATIENT
Start: 2025-03-18

## 2025-03-18 ASSESSMENT — PAIN SCALES - GENERAL: PAINLEVEL_OUTOF10: MILD PAIN (3)

## 2025-03-18 NOTE — LETTER
3/18/2025      Elidia Monterroso  [address] [facility] [address]      Dear Colleague,    Thank you for referring your patient, Elidia Monterroso, to the [facility]. Please see a copy of my visit note below.    Assessment/Plan:      Elidia was seen today for back pain.    Diagnoses and all orders for this visit:    Spinal stenosis of lumbar region with neurogenic claudication  -     DULoxetine (CYMBALTA) 20 MG capsule; Take 2 capsules (40 mg) by mouth daily.  -     EMG; Future    Lumbar facet arthropathy  -     DULoxetine (CYMBALTA) 20 MG capsule; Take 2 capsules (40 mg) by mouth daily.    Spondylolisthesis of lumbar region    Myofascial pain    Lumbar radicular pain  -     EMG; Future         Assessment: Pleasant 64 year old female otherwise healthy with:     1.  Chronic lumbar spine pain with left greater than right lower extremity radicular pain neurogenic claudication.  Persistent severe spinal stenosis L3-4 and L4-5 with spondylolisthesis at those levels and no instability from flexion to extension.  Degenerative severe facet arthritis and spondylolisthesis with lateral recess stenosis and moderate to severe L 3-4 central stenosis with severe facet arthropathy and synovial cyst posteriorly.  She has some associated left great toe extensor weakness which is very mild.  Pain is worsening despite physical therapy and home exercises regularly but only attended 1 visit due to cost.  Was up to 75% improved following bilateral L4-5 TFESI in November 2023 with subsequent repeat Injection in June and then October 2024.    Pain waxing waning and typically has done well following the injections but only temporarily.  Symptoms have improved some with duloxetine 20 mg daily.  Getting a second opinion from Dr. Ricardo at [facility] orthopedics.         Discussion:    1.  We discussed the new MRI images there is progression of the spinal stenosis at L4-5 as well as severe stenosis L3-4  with a transitional S1 vertebrae.  She is having more tolerable symptoms with regards to the radicular pain but still having issues.  We discussed options of further diagnostics medication management injections.  She is not wanting injection at this time.    2.  recommend EMG to evaluate for radiculopathy if positive would help determine if she would benefit from surgical intervention more urgently.    3.  Increase duloxetine to 40 mg daily.    4.  Follow-up at earliest convenience for EMG.    It was our pleasure caring for your patient today, if there any questions or concerns please do not hesitate to contact us.      Subjective:   Patient ID: Elidia Monterroso is a 64 year old female.    History of Present Illness: Patient presents for follow-up of lumbar spine pain with bilateral lower extremity radicular pain neurogenic claudication.  Continues to have back pain lumbosacral junction gluteal region.  Lower extremity paresthesias have improved some still having claudication symptoms with standing walking cramping in the legs at night.  Taking duloxetine 20 mg daily feels that it is helping with claudication symptoms also using red light therapy doing physical therapy stretches daily along with CBD which helps but still prolonged standing and walking causes pain.  Pain is an 8/10 at worst 3/10 today 0/10 at best.  Has had a bone density since last visit that was done yesterday no report available.  Seeing Dr. Ricardo at [facility] for second opinion regarding surgical intervention.  Taking Aleve regularly but no gabapentin or nabumetone.      Imaging: MRI report and images were personally reviewed from [facility] and discussed with the patient.   MRI of the lumbar spine from [facility] February 17 was reviewed along with lumbar flexion-extension x-rays.  Severe stenosis at L4-5 progressed from prior MRI impingement of the L5 nerves moderate right foraminal stenosis.  Severe central stenosis L3-4 with lateral  recess stenosis and moderate severe left foraminal stenosis.  Moderate L2-3 stenosis.  Transitional L5 segment.    Lumbar flexion-extension x-rays shows grade 1 spondylolisthesis L3 and L4 and trace L4 on L5 with no instability from flexion to extension.    Review of Systems: Some paresthesias in the legs.  Denies weakness bowel or bladder incontinence headaches swallowing issues fevers unintentional weight loss.           Current Outpatient Medications   Medication Sig Dispense Refill     b complex vitamins tablet [B COMPLEX VITAMINS TABLET] Take 1 tablet by mouth once.       cetirizine (ZYRTEC) 10 MG tablet [CETIRIZINE (ZYRTEC) 10 MG TABLET] Take 10 mg by mouth daily.       cholecalciferol, vitamin D3, 25 mcg (1,000 unit) capsule [CHOLECALCIFEROL, VITAMIN D3, 25 MCG (1,000 UNIT) CAPSULE] Take 1,000 Units by mouth once.       DULoxetine (CYMBALTA) 20 MG capsule Take 1 capsule (20 mg) by mouth daily. 30 capsule 1     fish oil-omega-3 fatty acids 300-1,000 mg capsule [FISH OIL-OMEGA-3 FATTY ACIDS 300-1,000 MG CAPSULE] Take 1 g by mouth daily.       gabapentin (NEURONTIN) 300 MG capsule Take 1 capsule (300 mg) by mouth 3 times daily. 90 capsule 0     glucosamine-chondroitin 500-400 mg cap [GLUCOSAMINE-CHONDROITIN 500-400 MG CAP] Take 1 capsule by mouth 3 (three) times a day.       magnesium 250 MG tablet Take 1 tablet by mouth daily       nabumetone (RELAFEN) 500 MG tablet TAKE ONE OR TWO TABLETS BY MOUTH TWICE DAILY as needed for moderate pain 30 tablet 0     tretinoin (RETIN-A) 0.05 % external cream Apply topically nightly as needed (skin condition) 45 g 0     triamcinolone (KENALOG) 0.1 % external cream Apply topically 2 times daily as needed for irritation (skin rash) 45 g 1     No current facility-administered medications for this visit.       Past Medical History:   Diagnosis Date     Bilateral hearing loss 1/17/2020    Hearing aids 2018      Mixed hyperlipidemia 1/17/2020     Restless legs syndrome (RLS)  1/17/2020       The following portions of the patient's history were reviewed and updated as appropriate: allergies, current medications, past family history, past medical history, past social history, past surgical history and problem list.           Objective:   Physical Exam:    /80   Pulse 65   There is no height or weight on file to calculate BMI.      General: Alert and oriented with normal affect. Attention, knowledge, memory, and language are intact. No acute distress.   Eyes: Sclerae are clear.  Respirations: Unlabored. CV: No lower extremity edema.         Sensation is intact to light touch throughout the  lower extremities.       Manual muscle testing reveals:  Right /Left out of 5     5/5 hip flexors  5/5 knee flexors  5/5 knee extensors  5/5 ankle plantar flexors  5/5 ankle dorsiflexors  5/5  [facility]       Again, thank you for allowing me to participate in the care of your patient.        Sincerely,        Ulysses Perez DO    Electronically signed

## 2025-04-15 ENCOUNTER — TRANSFERRED RECORDS (OUTPATIENT)
Dept: HEALTH INFORMATION MANAGEMENT | Facility: CLINIC | Age: 65
End: 2025-04-15
Payer: COMMERCIAL

## 2025-04-21 NOTE — PROGRESS NOTES
Assessment/Plan:      Elidia was seen today for emg.    Diagnoses and all orders for this visit:    Spondylolisthesis of lumbar region    Spinal stenosis of lumbar region with neurogenic claudication  -     EMG    Lumbar radicular pain  -     EMG    Myofascial pain    Lumbar facet arthropathy    Left leg weakness         Assessment: Pleasant 64 year old female otherwise healthy with:     1.  Chronic lumbar spine pain with left greater than right lower extremity radicular pain neurogenic claudication.  Persistent severe spinal stenosis L3-4 and L4-5 with spondylolisthesis at those levels and no instability from flexion to extension.  Degenerative severe facet arthritis and spondylolisthesis with lateral recess stenosis and moderate to severe L 3-4 central stenosis with severe facet arthropathy and synovial cyst posteriorly.  She has some associated left great toe extensor weakness which is very mild.  Pain is worsening despite physical therapy and home exercises regularly but only attended 1 visit due to cost.  Was up to 75% improved following bilateral L4-5 TFESI in November 2023 with subsequent repeat Injection in June and then October 2024.    Pain waxing waning and typically has done well following the injections but only temporarily.  Symptoms have improved some with duloxetine 20 mg daily.  Getting a second opinion from Dr. Ricardo at [facility] orthopedics.  Chronic left S1 radiculopathy and borderline changes which can be consistent with chronic S1 radiculopathy in the right lower extremity on EMG.         Discussion:    1.  We discussed her diagnosis and treatment options again we discussed the EMG.  We discussed her response to duloxetine.  Tolerating this well it may be helpful we discussed options of increasing duloxetine and she is not interested in that at this time but wants to continue with 20 mg nightly.    2.  Continue 20 mg daily of duloxetine refill provided when needed.    3.  Continue to follow  TONE ambulatory encounter  FAMILY PRACTICE OFFICE VISIT    CHIEF COMPLAINT:    Chief Complaint   Patient presents with   â¢ Ear Pain       SUBJECTIVE:  Edwin Salazar is a 21year old male who presented requesting evaluation for a 3 day history of bilateral otalgia that has been worse in the left ear. Symptoms are least bothersome in the morning and worsen as the day progresses. Yesterday his left ear felt completely closed and was sensitive to touch. He has been needing to pop his ears multiple times per day but plugging his nose with only temporary relief. He denies any drainage from his ears. No recent swimming. He does use ear buds on a regular basis. It is normal for him to postnasal drainage with the season changes. He has had intermittent nasal congestion as well. He suffers from chronic dizziness and headaches. He has used nasal saline multiple times and has tried Flonase once without any significant improvement. He also complains of chronic pain at the site where he had a cauterization in his left nostril a few years ago. Cauterization was done in the ER. He is also suffering from recurrent epistaxis from the site, approximately every week and a half. Bleeding lasts for a few minutes at most.    Review of systems:   All other systems are reviewed and are negative except as documented in the history of present illness. OBJECTIVE:  PROBLEM LIST:   Patient Active Problem List   Diagnosis   â¢ PTSD (post-traumatic stress disorder)   â¢ Anxiety   â¢ Dizziness   â¢ History of stroke       PAST HISTORIES:   I have reviewed the past medical history, family history, social history, medications and allergies listed in the medical record as obtained by my nursing staff and support staff and agree with their documentation.   ALLERGIES:   Allergen Reactions   â¢ Pork Allergy   (Food Or Med) VOMITING   â¢ Atorvastatin Other (See Comments)     dizziness     Current Outpatient Medications   Medication Sig "Dispense Refill   â¢ amoxicillin-clavulanate (Augmentin) 875-125 MG per tablet Take 1 tablet by mouth 2 times daily for 7 days. 14 tablet 0     No current facility-administered medications for this visit. 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 Skin:  Warm and dry without rash. Head:  Normocephalic, atraumatic. No lymphadenopathy  Neck:  Trachea is midline. No adenopathy. Eyes:  Normal conjunctivae and sclerae. Pupils equal, round and reactive to light. Extraocular movements intact. ENT:   Mucous membranes are moist.  Canals are clear bilaterally, left tympanic membrane is erythematous and slightly dull with serous effusion. Right tympanic membrane is gray and shiny with serous effusion. Posterior pharynx is erythematous, no edema or exudate. No facial tenderness. Normal nasal mucosa. Cardiovascular:  Regular rate and rhythm without murmur. Respiratory:   Normal respiratory effort. Clear to auscultation. No wheezes, rales or rhonchi. Psychiatric:   Cooperative. Appropriate mood and affect. Normal judgment. LAB RESULTS:   No lab tests performed today    ASSESSMENT:   1. Eustachian tube dysfunction, bilateral    2. Non-recurrent acute serous otitis media of left ear    3.  Recurrent epistaxis        PLAN:   Orders Placed This Encounter   â¢ SERVICE TO ENT   â¢ amoxicillin-clavulanate (Augmentin) 875-125 MG " with neurosurgery/orthopedics as given the chronic findings on the EMG I would recommend exploring surgical decompression and she is considering this.    4.  Follow-up with me as needed      It was our pleasure caring for your patient today, if there any questions or concerns please do not hesitate to contact us.    Over 20 minutes were spent on the date of the encounter performing chart review, patient visit and documentation in addition to any procedure.    Subjective:   Patient ID: Elidia Monterroso is a 64 year old female.    History of Present Illness: Patient presents for follow-up of lumbar spine pain with bilateral lower extremity pain left can be greater than right at times with pain with standing and walking.  Pain and paresthesias down the legs no bowel or bladder incontinence at this time.  Pain is a 4/10 at worst.  Better with stretching and sitting has home exercises.  Taking duloxetine 20 mg at night and occasionally Aleve as well.  Duloxetine 20 mg tolerating this well occasional nausea but overall doing well it might be helpful with the pain.  Reports being seen by Dr. Ricardo at [facility] since last visit considering lumbar fusion.  Once the EMG which was done today as well.    EMG: Left S1 radiculopathy chronic, some findings which can be consistent with but not confirmatory for a right chronic S1 radiculopathy.    Imaging: I reviewed MRI lumbar spine again today showing L3 and 4 spondylolisthesis and severe spinal stenosis L3-4 L4-5 with serpiginous nerve roots.  Milder stenosis at L2-3.    Review of Systems: Pertinent positives: Leg paresthesias.  Pertinent negatives: No  weakness.  No bowel or bladder incontinence.  No urinary retention.  No fevers, unintentional weight loss, balance changes, headaches, frequent falling, difficulty swallowing, or coordination difficulties.  All others reviewed are negative.         Current Outpatient Medications   Medication Sig Dispense Refill    b complex vitamins  per tablet       Bilateral eustachian tube dysfunction with left serous otitis media:  Restart Flonase, 2 sprays in each nostril daily. Given his recurrent epistaxis, I also want him to use nasal saline regularly. Discussed that the Flonase has a risk of nasal dryness/epistaxis and if his epistaxis increases, he should discontinue the Flonase. He was also placed on Augmentin twice daily for 7 days. Follow-up if no improvement in the next 5-7 days. Recurrent epistaxis: He was referred to ENT    No follow-ups on file. Instructions provided as documented in the after visit summary. The patient indicated understanding of the diagnosis and agreed with the plan of care.        Chau Araujo PA-C tablet [B COMPLEX VITAMINS TABLET] Take 1 tablet by mouth once.      cetirizine (ZYRTEC) 10 MG tablet [CETIRIZINE (ZYRTEC) 10 MG TABLET] Take 10 mg by mouth daily.      cholecalciferol, vitamin D3, 25 mcg (1,000 unit) capsule [CHOLECALCIFEROL, VITAMIN D3, 25 MCG (1,000 UNIT) CAPSULE] Take 1,000 Units by mouth once.      DULoxetine (CYMBALTA) 20 MG capsule Take 2 capsules (40 mg) by mouth daily. 60 capsule 2    fish oil-omega-3 fatty acids 300-1,000 mg capsule [FISH OIL-OMEGA-3 FATTY ACIDS 300-1,000 MG CAPSULE] Take 1 g by mouth daily.      gabapentin (NEURONTIN) 300 MG capsule Take 1 capsule (300 mg) by mouth 3 times daily. 90 capsule 0    glucosamine-chondroitin 500-400 mg cap [GLUCOSAMINE-CHONDROITIN 500-400 MG CAP] Take 1 capsule by mouth 3 (three) times a day.      magnesium 250 MG tablet Take 1 tablet by mouth daily      nabumetone (RELAFEN) 500 MG tablet TAKE ONE OR TWO TABLETS BY MOUTH TWICE DAILY as needed for moderate pain 30 tablet 0    tretinoin (RETIN-A) 0.05 % external cream Apply topically nightly as needed (skin condition) 45 g 0    triamcinolone (KENALOG) 0.1 % external cream Apply topically 2 times daily as needed for irritation (skin rash) 45 g 1     No current facility-administered medications for this visit.       Past Medical History:   Diagnosis Date    Bilateral hearing loss 1/17/2020    Hearing aids 2018     Mixed hyperlipidemia 1/17/2020    Restless legs syndrome (RLS) 1/17/2020       The following portions of the patient's history were reviewed and updated as appropriate: allergies, current medications, past family history, past medical history, past social history, past surgical history and problem list.           Objective:   Physical Exam:    /83   Pulse 62   There is no height or weight on file to calculate BMI.      General: Alert and oriented with normal affect. Attention, knowledge, memory, and language are intact. No acute distress.   Eyes: Sclerae are clear.  Respirations:  Unlabored. CV: No lower extremity edema.     Gait:  Nonantalgic    Sensation is intact to light touch throughout the   lower extremities.  Reflexes are   2+ patellar and absent Achilles     Manual muscle testing reveals:  Right /Left out of 5     5/5 hip flexors  5/5 knee flexors  5/5 knee extensors  5/5 ankle plantar flexors  5/5 ankle dorsiflexors  5/5  [facility]

## 2025-04-22 ENCOUNTER — OFFICE VISIT (OUTPATIENT)
Dept: PHYSICAL MEDICINE AND REHAB | Facility: CLINIC | Age: 65
End: 2025-04-22
Attending: PHYSICAL MEDICINE & REHABILITATION
Payer: COMMERCIAL

## 2025-04-22 VITALS — HEART RATE: 62 BPM | SYSTOLIC BLOOD PRESSURE: 130 MMHG | DIASTOLIC BLOOD PRESSURE: 83 MMHG

## 2025-04-22 DIAGNOSIS — M54.16 LUMBAR RADICULAR PAIN: ICD-10-CM

## 2025-04-22 DIAGNOSIS — M79.18 MYOFASCIAL PAIN: ICD-10-CM

## 2025-04-22 DIAGNOSIS — R29.898 LEFT LEG WEAKNESS: ICD-10-CM

## 2025-04-22 DIAGNOSIS — M43.16 SPONDYLOLISTHESIS OF LUMBAR REGION: Primary | ICD-10-CM

## 2025-04-22 DIAGNOSIS — M48.062 SPINAL STENOSIS OF LUMBAR REGION WITH NEUROGENIC CLAUDICATION: ICD-10-CM

## 2025-04-22 DIAGNOSIS — M47.816 LUMBAR FACET ARTHROPATHY: ICD-10-CM

## 2025-04-22 PROCEDURE — 95886 MUSC TEST DONE W/N TEST COMP: CPT | Mod: RT | Performed by: PHYSICAL MEDICINE & REHABILITATION

## 2025-04-22 PROCEDURE — 95909 NRV CNDJ TST 5-6 STUDIES: CPT | Performed by: PHYSICAL MEDICINE & REHABILITATION

## 2025-04-22 PROCEDURE — 99214 OFFICE O/P EST MOD 30 MIN: CPT | Mod: 25 | Performed by: PHYSICAL MEDICINE & REHABILITATION

## 2025-04-22 ASSESSMENT — PAIN SCALES - GENERAL: PAINLEVEL_OUTOF10: MODERATE PAIN (4)

## 2025-04-22 NOTE — LETTER
4/22/2025      Elidia Monterroso  [address] [facility] [address]      Dear Colleague,    Thank you for referring your patient, Elidia Monterroso, to the [facility]. Please see a copy of my visit note below.    Assessment/Plan:      Elidia was seen today for emg.    Diagnoses and all orders for this visit:    Spondylolisthesis of lumbar region    Spinal stenosis of lumbar region with neurogenic claudication  -     EMG    Lumbar radicular pain  -     EMG    Myofascial pain    Lumbar facet arthropathy    Left leg weakness         Assessment: Pleasant 64 year old female otherwise healthy with:     1.  Chronic lumbar spine pain with left greater than right lower extremity radicular pain neurogenic claudication.  Persistent severe spinal stenosis L3-4 and L4-5 with spondylolisthesis at those levels and no instability from flexion to extension.  Degenerative severe facet arthritis and spondylolisthesis with lateral recess stenosis and moderate to severe L 3-4 central stenosis with severe facet arthropathy and synovial cyst posteriorly.  She has some associated left great toe extensor weakness which is very mild.  Pain is worsening despite physical therapy and home exercises regularly but only attended 1 visit due to cost.  Was up to 75% improved following bilateral L4-5 TFESI in November 2023 with subsequent repeat Injection in June and then October 2024.    Pain waxing waning and typically has done well following the injections but only temporarily.  Symptoms have improved some with duloxetine 20 mg daily.  Getting a second opinion from Dr. Ricardo at [facility] orthopedics.  Chronic left S1 radiculopathy and borderline changes which can be consistent with chronic S1 radiculopathy in the right lower extremity on EMG.         Discussion:    1.  We discussed her diagnosis and treatment options again we discussed the EMG.  We discussed her response to duloxetine.  Tolerating this well  it may be helpful we discussed options of increasing duloxetine and she is not interested in that at this time but wants to continue with 20 mg nightly.    2.  Continue 20 mg daily of duloxetine refill provided when needed.    3.  Continue to follow with neurosurgery/orthopedics as given the chronic findings on the EMG I would recommend exploring surgical decompression and she is considering this.    4.  Follow-up with me as needed      It was our pleasure caring for your patient today, if there any questions or concerns please do not hesitate to contact us.    Over 20 minutes were spent on the date of the encounter performing chart review, patient visit and documentation in addition to any procedure.    Subjective:   Patient ID: Elidia Monterroso is a 64 year old female.    History of Present Illness: Patient presents for follow-up of lumbar spine pain with bilateral lower extremity pain left can be greater than right at times with pain with standing and walking.  Pain and paresthesias down the legs no bowel or bladder incontinence at this time.  Pain is a 4/10 at worst.  Better with stretching and sitting has home exercises.  Taking duloxetine 20 mg at night and occasionally Aleve as well.  Duloxetine 20 mg tolerating this well occasional nausea but overall doing well it might be helpful with the pain.  Reports being seen by Dr. Ricardo at [facility] since last visit considering lumbar fusion.  Once the EMG which was done today as well.    EMG: Left S1 radiculopathy chronic, some findings which can be consistent with but not confirmatory for a right chronic S1 radiculopathy.    Imaging: I reviewed MRI lumbar spine again today showing L3 and 4 spondylolisthesis and severe spinal stenosis L3-4 L4-5 with serpiginous nerve roots.  Milder stenosis at L2-3.    Review of Systems: Pertinent positives: Leg paresthesias.  Pertinent negatives: No  weakness.  No bowel or bladder incontinence.  No urinary retention.  No fevers,  unintentional weight loss, balance changes, headaches, frequent falling, difficulty swallowing, or coordination difficulties.  All others reviewed are negative.         Current Outpatient Medications   Medication Sig Dispense Refill     b complex vitamins tablet [B COMPLEX VITAMINS TABLET] Take 1 tablet by mouth once.       cetirizine (ZYRTEC) 10 MG tablet [CETIRIZINE (ZYRTEC) 10 MG TABLET] Take 10 mg by mouth daily.       cholecalciferol, vitamin D3, 25 mcg (1,000 unit) capsule [CHOLECALCIFEROL, VITAMIN D3, 25 MCG (1,000 UNIT) CAPSULE] Take 1,000 Units by mouth once.       DULoxetine (CYMBALTA) 20 MG capsule Take 2 capsules (40 mg) by mouth daily. 60 capsule 2     fish oil-omega-3 fatty acids 300-1,000 mg capsule [FISH OIL-OMEGA-3 FATTY ACIDS 300-1,000 MG CAPSULE] Take 1 g by mouth daily.       gabapentin (NEURONTIN) 300 MG capsule Take 1 capsule (300 mg) by mouth 3 times daily. 90 capsule 0     glucosamine-chondroitin 500-400 mg cap [GLUCOSAMINE-CHONDROITIN 500-400 MG CAP] Take 1 capsule by mouth 3 (three) times a day.       magnesium 250 MG tablet Take 1 tablet by mouth daily       nabumetone (RELAFEN) 500 MG tablet TAKE ONE OR TWO TABLETS BY MOUTH TWICE DAILY as needed for moderate pain 30 tablet 0     tretinoin (RETIN-A) 0.05 % external cream Apply topically nightly as needed (skin condition) 45 g 0     triamcinolone (KENALOG) 0.1 % external cream Apply topically 2 times daily as needed for irritation (skin rash) 45 g 1     No current facility-administered medications for this visit.       Past Medical History:   Diagnosis Date     Bilateral hearing loss 1/17/2020    Hearing aids 2018      Mixed hyperlipidemia 1/17/2020     Restless legs syndrome (RLS) 1/17/2020       The following portions of the patient's history were reviewed and updated as appropriate: allergies, current medications, past family history, past medical history, past social history, past surgical history and problem list.           Objective:    Physical Exam:    /83   Pulse 62   There is no height or weight on file to calculate BMI.      General: Alert and oriented with normal affect. Attention, knowledge, memory, and language are intact. No acute distress.   Eyes: Sclerae are clear.  Respirations: Unlabored. CV: No lower extremity edema.     Gait:  Nonantalgic    Sensation is intact to light touch throughout the   lower extremities.  Reflexes are   2+ patellar and absent Achilles     Manual muscle testing reveals:  Right /Left out of 5     5/5 hip flexors  5/5 knee flexors  5/5 knee extensors  5/5 ankle plantar flexors  5/5 ankle dorsiflexors  5/5  [facility]  [address] [place] [address] [address]  [place] [address]  Office: [phone] Fax: [phone]    Electromyography and Nerve Conduction Study Report        Indication: Patient presents at the request of Dr. Ulysses Perez for a bilateral lower extremity EMG.  She has lumbar spine pain with left greater than right lower extremity claudication symptoms standing and walking.  On exam, normal sensation to light touch to lower extremities, 2+ patellar 0 Achilles reflexes bilaterally, normal muscle strength throughout the major muscle groups of the bilateral lower extremities.        Pt Exam Discussion (Communication Barriers):  Electromyography and nerve conduction testing, including associated discomfort, risks, benefits, and alternatives was discussed with the patient prior to the procedure.  No learning/ communication barriers; patient verbalized understanding of procedure.  Informed consent was obtained.           Pt Assessment:  Testing was successfully completed; patient tolerated testing well.       Blood Thinners: None Skin Temperature: Warmed 32.7                   EMG/NCS  results:     Nerve Conduction Studies  Motor Sites      Segment Distal Latency Neg. Amp CV F-Latency F-Estimate Comment   Site  (ms) (mV) (m/s) (ms) (ms)    Left Fibular (EDB)  Motor   Ankle Ankle-EDB 4.1 3.6       Knee Knee-Ankle 12.4 3.1 46      Right Fibular (EDB) Motor   Ankle Ankle-EDB 4.4 5.8       Knee Knee-Ankle 12.7 5.6 46      Left Tibial (AH) Motor   Ankle Ankle-AH 3.5 6.1       Knee Knee-Ankle 12.3 *2.5 44      Right Tibial (AH) Motor   Ankle Ankle-AH 3.5 6.5       Knee Knee-Ankle 11.7 *3.5 49        Sensory Sites      Onset Lat Peak Lat Amp CV Comment   Site (ms) (ms) ( V) (m/s)    Left Sural Sensory   B-Ankle 3.1 3.5 7 -    Right Sural Sensory   B-Ankle 3.2 3.7 7 -      H-Reflex Sites      M-Lat H Lat H Neg Amp   Site (ms) (ms) (mV)   Left Tibial (Soleus) H-Reflex   Pop Fossa 6.8 43.1 0.99   Right Tibial (Soleus) H-Reflex   Pop Fossa 5.8 - -     H-Reflex Sites      Lt. H Lat Rt. H Lat L-R H Lat L-R H Neg Amp   Site (ms) (ms) (ms) Norm (mV)   Tibial (Soleus) H-Reflex   Pop Fossa 43.1 - -  < 3.0 -       NCS Waveforms:    Motor                Sensory         H-Reflex           Electromyography     Side Muscle Nerve Root Ins Act Fibs Psw Fasc Recrt Dur Amp Poly Comment   Right AntTibialis Dp Br Fibular L4-5 Nml Nml Nml Nml Nml Nml Nml 0    Right Gastroc Tibial S1-2 Nml Nml Nml Nml Nml +/- long Nml 0    Right Fibularis Long Sup Br Fibular L5-S1 Nml Nml Nml Nml Nml Nml Nml 0    Right VastusLat Femoral L2-4 Nml Nml Nml Nml Nml Nml Nml 0    Right TensorFascLat SupGluteal L4-5, S1 Nml Nml Nml Nml Nml Nml Nml 0    Right GluteusMax InfGluteal L5-S2 Nml Nml Nml Nml Nml Nml Nml 0    Left AntTibialis Dp Br Fibular L4-5 Nml Nml Nml Nml Nml Nml Nml 0    Left Gastroc Tibial S1-2 Nml Nml Nml Nml Nml *>12ms *Incr 0    Left Fibularis Long Sup Br Fibular L5-S1 Nml Nml Nml Nml Nml *>12ms *Incr 0    Left VastusLat Femoral L2-4 Nml Nml Nml Nml Nml Nml Nml 0    Left TensorFascLat SupGluteal L4-5, S1 Nml Nml Nml Nml Nml Nml Nml 0    Left GluteusMax InfGluteal L5-S2 Nml Nml Nml Nml Nml *>12ms *Incr 0    Left Lumbo Parasp Low Rami L5-S1 Nml Nml Nml Nml  Nml Nml           Comment NCS: Essentially normal  study:  1.  Absent bilateral tibial H reflexes can be normal for patient's age.    2.  Normal bilateral sural SNAPs and peroneal and tibial CMAP's.           Comment EMG: Abnormal study:  1.  Prolonged bony and potential duration left medial gastrocnemius muscle peroneus longus and gluteus melba with remainder left lower extremity needle EMG normal.      2.  Borderline prolonged motor unit potential duration right medial gastrocnemius muscle with the remainder of the right lower extremity needle EMG being normal.        Interpretation: Abnormal study: There is electrodiagnostic evidence of:      1.  Left S1 radiculopathy, chronic.      2. There are also findings of borderline motor unit potential duration of the right medial gastrocnemius muscle.  This finding is borderline normal or, under the correct clinical condition, this can be observed in a chronic right S1 radiculopathy.      3. There is no electrodiagnostic evidence of   focal neuropathy in the bilateral lower extremities.    The testing was completed in its entirety by the physician.      It was our pleasure caring for your patient today, if there any questions or concerns please do not hesitate to contact us.        Again, thank you for allowing me to participate in the care of your patient.        Sincerely,        Ulysses Perez, DO    Electronically signed

## 2025-04-22 NOTE — PROGRESS NOTES
[facility]  [address] [place] [id number]  Office: [phone] Fax: [phone]    Electromyography and Nerve Conduction Study Report        Indication: Patient presents at the request of Dr. Ulysses Perez for a bilateral lower extremity EMG.  She has lumbar spine pain with left greater than right lower extremity claudication symptoms standing and walking.  On exam, normal sensation to light touch to lower extremities, 2+ patellar 0 Achilles reflexes bilaterally, normal muscle strength throughout the major muscle groups of the bilateral lower extremities.        Pt Exam Discussion (Communication Barriers):  Electromyography and nerve conduction testing, including associated discomfort, risks, benefits, and alternatives was discussed with the patient prior to the procedure.  No learning/ communication barriers; patient verbalized understanding of procedure.  Informed consent was obtained.           Pt Assessment:  Testing was successfully completed; patient tolerated testing well.       Blood Thinners: None Skin Temperature: Warmed 32.7                   EMG/NCS  results:     Nerve Conduction Studies  Motor Sites      Segment Distal Latency Neg. Amp CV F-Latency F-Estimate Comment   Site  (ms) (mV) (m/s) (ms) (ms)    Left Fibular (EDB) Motor   Ankle Ankle-EDB 4.1 3.6       Knee Knee-Ankle 12.4 3.1 46      Right Fibular (EDB) Motor   Ankle Ankle-EDB 4.4 5.8       Knee Knee-Ankle 12.7 5.6 46      Left Tibial (AH) Motor   Ankle Ankle-AH 3.5 6.1       Knee Knee-Ankle 12.3 *2.5 44      Right Tibial (AH) Motor   Ankle Ankle-AH 3.5 6.5       Knee Knee-Ankle 11.7 *3.5 49        Sensory Sites      Onset Lat Peak Lat Amp CV Comment   Site (ms) (ms) ( V) (m/s)    Left Sural Sensory   B-Ankle 3.1 3.5 7 -    Right Sural Sensory   B-Ankle 3.2 3.7 7 -      H-Reflex Sites      M-Lat H Lat H Neg Amp   Site (ms) (ms) (mV)   Left Tibial (Soleus) H-Reflex   Pop Fossa 6.8 43.1 0.99    Right Tibial (Soleus) H-Reflex   Pop Fossa 5.8 - -     H-Reflex Sites      Lt. H Lat Rt. H Lat L-R H Lat L-R H Neg Amp   Site (ms) (ms) (ms) Norm (mV)   Tibial (Soleus) H-Reflex   Pop Fossa 43.1 - -  < 3.0 -       NCS Waveforms:    Motor                Sensory         H-Reflex           Electromyography     Side Muscle Nerve Root Ins Act Fibs Psw Fasc Recrt Dur Amp Poly Comment   Right AntTibialis Dp Br Fibular L4-5 Nml Nml Nml Nml Nml Nml Nml 0    Right Gastroc Tibial S1-2 Nml Nml Nml Nml Nml +/- long Nml 0    Right Fibularis Long Sup Br Fibular L5-S1 Nml Nml Nml Nml Nml Nml Nml 0    Right VastusLat Femoral L2-4 Nml Nml Nml Nml Nml Nml Nml 0    Right TensorFascLat SupGluteal L4-5, S1 Nml Nml Nml Nml Nml Nml Nml 0    Right GluteusMax InfGluteal L5-S2 Nml Nml Nml Nml Nml Nml Nml 0    Left AntTibialis Dp Br Fibular L4-5 Nml Nml Nml Nml Nml Nml Nml 0    Left Gastroc Tibial S1-2 Nml Nml Nml Nml Nml *>12ms *Incr 0    Left Fibularis Long Sup Br Fibular L5-S1 Nml Nml Nml Nml Nml *>12ms *Incr 0    Left VastusLat Femoral L2-4 Nml Nml Nml Nml Nml Nml Nml 0    Left TensorFascLat SupGluteal L4-5, S1 Nml Nml Nml Nml Nml Nml Nml 0    Left GluteusMax InfGluteal L5-S2 Nml Nml Nml Nml Nml *>12ms *Incr 0    Left Lumbo Parasp Low Rami L5-S1 Nml Nml Nml Nml  Nml Nml           Comment NCS: Essentially normal study:  1.  Absent bilateral tibial H reflexes can be normal for patient's age.    2.  Normal bilateral sural SNAPs and peroneal and tibial CMAP's.           Comment EMG: Abnormal study:  1.  Prolonged bony and potential duration left medial gastrocnemius muscle peroneus longus and gluteus melba with remainder left lower extremity needle EMG normal.      2.  Borderline prolonged motor unit potential duration right medial gastrocnemius muscle with the remainder of the right lower extremity needle EMG being normal.        Interpretation: Abnormal study: There is electrodiagnostic evidence of:      1.  Left S1 radiculopathy,  chronic.      2. There are also findings of borderline motor unit potential duration of the right medial gastrocnemius muscle.  This finding is borderline normal or, under the correct clinical condition, this can be observed in a chronic right S1 radiculopathy.      3. There is no electrodiagnostic evidence of   focal neuropathy in the bilateral lower extremities.    The testing was completed in its entirety by the physician.      It was our pleasure caring for your patient today, if there any questions or concerns please do not hesitate to contact us.

## 2025-04-22 NOTE — PATIENT INSTRUCTIONS
Continue with Duloxetine  You can have another injection if needed  3. Continue the plan to follow with surgeon.

## 2025-07-27 DIAGNOSIS — M47.816 LUMBAR FACET ARTHROPATHY: ICD-10-CM

## 2025-07-27 DIAGNOSIS — M48.062 SPINAL STENOSIS OF LUMBAR REGION WITH NEUROGENIC CLAUDICATION: ICD-10-CM

## 2025-07-28 RX ORDER — DULOXETIN HYDROCHLORIDE 20 MG/1
40 CAPSULE, DELAYED RELEASE ORAL DAILY
Qty: 60 CAPSULE | Refills: 0 | Status: SHIPPED | OUTPATIENT
Start: 2025-07-28

## 2025-08-07 PROBLEM — G25.81 RESTLESS LEGS SYNDROME (RLS): Status: RESOLVED | Noted: 2020-01-17 | Resolved: 2025-08-07

## 2025-08-11 ENCOUNTER — PATIENT OUTREACH (OUTPATIENT)
Dept: CARE COORDINATION | Facility: CLINIC | Age: 65
End: 2025-08-11
Payer: COMMERCIAL

## 2025-08-25 ENCOUNTER — ANCILLARY PROCEDURE (OUTPATIENT)
Dept: MAMMOGRAPHY | Facility: CLINIC | Age: 65
End: 2025-08-25
Attending: FAMILY MEDICINE
Payer: COMMERCIAL

## 2025-08-25 DIAGNOSIS — Z12.31 SCREENING MAMMOGRAM FOR BREAST CANCER: ICD-10-CM

## 2025-08-25 DIAGNOSIS — M48.062 SPINAL STENOSIS OF LUMBAR REGION WITH NEUROGENIC CLAUDICATION: ICD-10-CM

## 2025-08-25 DIAGNOSIS — M47.816 LUMBAR FACET ARTHROPATHY: ICD-10-CM

## 2025-08-25 PROCEDURE — 77063 BREAST TOMOSYNTHESIS BI: CPT

## 2025-08-25 RX ORDER — DULOXETIN HYDROCHLORIDE 20 MG/1
40 CAPSULE, DELAYED RELEASE ORAL DAILY
Qty: 60 CAPSULE | Refills: 0 | Status: SHIPPED | OUTPATIENT
Start: 2025-08-25